# Patient Record
Sex: FEMALE | Race: WHITE | Employment: FULL TIME | URBAN - METROPOLITAN AREA
[De-identification: names, ages, dates, MRNs, and addresses within clinical notes are randomized per-mention and may not be internally consistent; named-entity substitution may affect disease eponyms.]

---

## 2017-01-23 ENCOUNTER — OFFICE VISIT (OUTPATIENT)
Dept: FAMILY MEDICINE | Facility: CLINIC | Age: 37
End: 2017-01-23
Payer: MEDICAID

## 2017-01-23 VITALS
WEIGHT: 221.06 LBS | SYSTOLIC BLOOD PRESSURE: 116 MMHG | HEIGHT: 66 IN | TEMPERATURE: 98.9 F | BODY MASS INDEX: 35.53 KG/M2 | HEART RATE: 99 BPM | OXYGEN SATURATION: 100 % | DIASTOLIC BLOOD PRESSURE: 74 MMHG

## 2017-01-23 DIAGNOSIS — R07.0 THROAT PAIN: Primary | ICD-10-CM

## 2017-01-23 LAB
DEPRECATED S PYO AG THROAT QL EIA: NORMAL
MICRO REPORT STATUS: NORMAL
SPECIMEN SOURCE: NORMAL

## 2017-01-23 PROCEDURE — 99213 OFFICE O/P EST LOW 20 MIN: CPT | Performed by: PHYSICIAN ASSISTANT

## 2017-01-23 PROCEDURE — 87081 CULTURE SCREEN ONLY: CPT | Performed by: PHYSICIAN ASSISTANT

## 2017-01-23 PROCEDURE — 87880 STREP A ASSAY W/OPTIC: CPT | Performed by: PHYSICIAN ASSISTANT

## 2017-01-23 NOTE — MR AVS SNAPSHOT
After Visit Summary   1/23/2017    Maribel Osuna    MRN: [id number]           Patient Information     Date Of Birth          1980        Visit Information        Provider Department      1/23/2017 2:20 PM Wisam Thomson PA-C [facility]        Today's Diagnoses     Throat pain    -  1       Care Instructions      Viral Pharyngitis (Sore Throat)    You (or your child, if your child is the patient) have pharyngitis (sore throat). This infection is caused by a virus. It can cause throat pain that is worse when swallowing, aching all over, headache, and fever. The infection may be spread by coughing, kissing, or touching others after touching your mouth or nose. Antibiotic medications do not work against viruses, so they are not used for treating this condition.  Home care    If your symptoms are severe, rest at home. Return to work or school when you feel well enough.     Drink plenty of fluids to avoid dehydration.    For children: Use acetaminophen for fever, fussiness or discomfort. In infants over six months of age, you may use ibuprofen instead of acetaminophen. (NOTE: If your child has chronic liver or kidney disease or ever had a stomach ulcer or GI bleeding, talk with your doctor before using these medicines.) (NOTE: Aspirin should never be used in anyone under 18 years of age who is ill with a fever. It may cause severe liver damage.)     For adults: You may use acetaminophen or ibuprofen to control pain or fever, unless another medicine was prescribed for this. (NOTE: If you have chronic liver or kidney disease or ever had a stomach ulcer or GI bleeding, talk with your doctor before using these medicines.)    Throat lozenges or numbing throat sprays can help reduce pain. Gargling with warm salt water will also help reduce throat pain. For this, dissolve 1/2 teaspoon of salt in 1 glass of warm water. To help soothe a sore throat, children can sip on juice or a popsicle.  Children 5 years and older can also suck on a lollipop or hard candy.    Avoid salty or spicy foods, which can be irritating to the throat.  Follow-up care  Follow up with your healthcare provider or our staff if you are not improving over the next week.  When to seek medical advice  Call your healthcare provider right away if any of these occur:    Fever as directed by your doctor.  For children, seek care if:    Your child is of any age and has repeated fevers above 104 F (40 C).    Your child is younger than 2 years of age and has a fever of 100.4 F (38 C) that continues for more than 1 day.    Your child is 2 years old or older and has a fever of 100.4 F (38 C) that continues for more than 3 days.    New or worsening ear pain, sinus pain, or headache    Painful lumps in the back of neck    Stiff neck    Lymph nodes are getting larger    Inability to swallow liquids, excessive drooling, or inability to open mouth wide due to throat pain    Signs of dehydration (very dark urine or no urine, sunken eyes, dizziness)    Trouble breathing or noisy breathing    Muffled voice    New rash    Child appears to be getting sicker    9332-5619 [employer]. [address] [place] [address]. All rights reserved. This information is not intended as a substitute for professional medical care. Always follow your healthcare professional's instructions.        Viral Pharyngitis (Sore Throat)    You (or your child, if your child is the patient) have pharyngitis (sore throat). This infection is caused by a virus. It can cause throat pain that is worse when swallowing, aching all over, headache, and fever. The infection may be spread by coughing, kissing, or touching others after touching your mouth or nose. Antibiotic medications do not work against viruses, so they are not used for treating this condition.  Home care    If your symptoms are severe, rest at home. Return to work or school when you feel well  enough.     Drink plenty of fluids to avoid dehydration.    For children: Use acetaminophen for fever, fussiness or discomfort. In infants over six months of age, you may use ibuprofen instead of acetaminophen. (NOTE: If your child has chronic liver or kidney disease or ever had a stomach ulcer or GI bleeding, talk with your doctor before using these medicines.) (NOTE: Aspirin should never be used in anyone under 18 years of age who is ill with a fever. It may cause severe liver damage.)     For adults: You may use acetaminophen or ibuprofen to control pain or fever, unless another medicine was prescribed for this. (NOTE: If you have chronic liver or kidney disease or ever had a stomach ulcer or GI bleeding, talk with your doctor before using these medicines.)    Throat lozenges or numbing throat sprays can help reduce pain. Gargling with warm salt water will also help reduce throat pain. For this, dissolve 1/2 teaspoon of salt in 1 glass of warm water. To help soothe a sore throat, children can sip on juice or a popsicle. Children 5 years and older can also suck on a lollipop or hard candy.    Avoid salty or spicy foods, which can be irritating to the throat.  Follow-up care  Follow up with your healthcare provider or our staff if you are not improving over the next week.  When to seek medical advice  Call your healthcare provider right away if any of these occur:    Fever as directed by your doctor.  For children, seek care if:    Your child is of any age and has repeated fevers above 104 F (40 C).    Your child is younger than 2 years of age and has a fever of 100.4 F (38 C) that continues for more than 1 day.    Your child is 2 years old or older and has a fever of 100.4 F (38 C) that continues for more than 3 days.    New or worsening ear pain, sinus pain, or headache    Painful lumps in the back of neck    Stiff neck    Lymph nodes are getting larger    Inability to swallow liquids, excessive drooling, or  "inability to open mouth wide due to throat pain    Signs of dehydration (very dark urine or no urine, sunken eyes, dizziness)    Trouble breathing or noisy breathing    Muffled voice    New rash    Child appears to be getting sicker    7412-1890 [employer]. [address] [place] [address]. All rights reserved. This information is not intended as a substitute for professional medical care. Always follow your healthcare professional's instructions.              Follow-ups after your visit        Who to contact     If you have questions or need follow up information about today's clinic visit or your schedule please contact [facility] directly at [phone].  Normal or non-critical lab and imaging results will be communicated to you by MyChart, letter or phone within 4 business days after the clinic has received the results. If you do not hear from us within 7 days, please contact the clinic through [employer] or phone. If you have a critical or abnormal lab result, we will notify you by phone as soon as possible.  Submit refill requests through [employer] or call your pharmacy and they will forward the refill request to us. Please allow 3 business days for your refill to be completed.          Additional Information About Your Visit        MyChart Information     [employer] lets you send messages to your doctor, view your test results, renew your prescriptions, schedule appointments and more. To sign up, go to [email or web address] . Click on \"Log in\" on the left side of the screen, which will take you to the Welcome page. Then click on \"Sign up Now\" on the right side of the page.     You will be asked to enter the access code listed below, as well as some personal information. Please follow the directions to create your username and password.     Your access code is: [email or web address]  Expires: 2017  2:42 PM     Your access code will  in 90 days. If you need help or a new code, " "please call your [facility] clinic or [phone].        Care EveryWhere ID     This is your Care EveryWhere ID. This could be used by other organizations to access your [facility] medical records  [id number]        Your Vitals Were     Pulse Temperature Height BMI (Body Mass Index) Pulse Oximetry Last Period    99 98.9  F (37.2  C) (Oral) 5' 5.5\" (1.664 m) 36.21 kg/m2 100% 01/11/2017    Breastfeeding?                   No            Blood Pressure from Last 3 Encounters:   01/23/17 116/74   02/22/16 127/87   02/13/15 106/72    Weight from Last 3 Encounters:   01/23/17 221 lb 1 oz (100.273 kg)   02/22/16 201 lb (91.173 kg)   02/13/15 231 lb (104.781 kg)              We Performed the Following     Beta strep group A culture     Strep, Rapid Screen        Primary Care Provider Office Phone # Fax #    Ida Murguia [phone]       [place] [address]        Thank you!     Thank you for choosing [facility]  for your care. Our goal is always to provide you with excellent care. Hearing back from our patients is one way we can continue to improve our services. Please take a few minutes to complete the written survey that you may receive in the mail after your visit with us. Thank you!             Your Updated Medication List - Protect others around you: Learn how to safely use, store and throw away your medicines at www.disposemymeds.org.          This list is accurate as of: 1/23/17  2:42 PM.  Always use your most recent med list.                   Brand Name Dispense Instructions for use    B-12 1000 MCG Tbcr     100 tablet    Take 100 mg by mouth daily       BENADRYL PO          MELATONIN PO      Take 2.5 mg by mouth At Bedtime       Multi-vitamin Tabs tablet     100 tablet    Take 1 tablet by mouth 3 times daily       triamcinolone 0.1 % cream    KENALOG    80 g    Apply sparingly to affected area three times daily as needed         "

## 2017-01-23 NOTE — PATIENT INSTRUCTIONS
Viral Pharyngitis (Sore Throat)    You (or your child, if your child is the patient) have pharyngitis (sore throat). This infection is caused by a virus. It can cause throat pain that is worse when swallowing, aching all over, headache, and fever. The infection may be spread by coughing, kissing, or touching others after touching your mouth or nose. Antibiotic medications do not work against viruses, so they are not used for treating this condition.  Home care    If your symptoms are severe, rest at home. Return to work or school when you feel well enough.     Drink plenty of fluids to avoid dehydration.    For children: Use acetaminophen for fever, fussiness or discomfort. In infants over six months of age, you may use ibuprofen instead of acetaminophen. (NOTE: If your child has chronic liver or kidney disease or ever had a stomach ulcer or GI bleeding, talk with your doctor before using these medicines.) (NOTE: Aspirin should never be used in anyone under 18 years of age who is ill with a fever. It may cause severe liver damage.)     For adults: You may use acetaminophen or ibuprofen to control pain or fever, unless another medicine was prescribed for this. (NOTE: If you have chronic liver or kidney disease or ever had a stomach ulcer or GI bleeding, talk with your doctor before using these medicines.)    Throat lozenges or numbing throat sprays can help reduce pain. Gargling with warm salt water will also help reduce throat pain. For this, dissolve 1/2 teaspoon of salt in 1 glass of warm water. To help soothe a sore throat, children can sip on juice or a popsicle. Children 5 years and older can also suck on a lollipop or hard candy.    Avoid salty or spicy foods, which can be irritating to the throat.  Follow-up care  Follow up with your healthcare provider or our staff if you are not improving over the next week.  When to seek medical advice  Call your healthcare provider right away if any of these occur:     Fever as directed by your doctor.  For children, seek care if:    Your child is of any age and has repeated fevers above 104 F (40 C).    Your child is younger than 2 years of age and has a fever of 100.4 F (38 C) that continues for more than 1 day.    Your child is 2 years old or older and has a fever of 100.4 F (38 C) that continues for more than 3 days.    New or worsening ear pain, sinus pain, or headache    Painful lumps in the back of neck    Stiff neck    Lymph nodes are getting larger    Inability to swallow liquids, excessive drooling, or inability to open mouth wide due to throat pain    Signs of dehydration (very dark urine or no urine, sunken eyes, dizziness)    Trouble breathing or noisy breathing    Muffled voice    New rash    Child appears to be getting sicker    4907-7028 [employer]. [address] [place] [address]. All rights reserved. This information is not intended as a substitute for professional medical care. Always follow your healthcare professional's instructions.        Viral Pharyngitis (Sore Throat)    You (or your child, if your child is the patient) have pharyngitis (sore throat). This infection is caused by a virus. It can cause throat pain that is worse when swallowing, aching all over, headache, and fever. The infection may be spread by coughing, kissing, or touching others after touching your mouth or nose. Antibiotic medications do not work against viruses, so they are not used for treating this condition.  Home care    If your symptoms are severe, rest at home. Return to work or school when you feel well enough.     Drink plenty of fluids to avoid dehydration.    For children: Use acetaminophen for fever, fussiness or discomfort. In infants over six months of age, you may use ibuprofen instead of acetaminophen. (NOTE: If your child has chronic liver or kidney disease or ever had a stomach ulcer or GI bleeding, talk with your doctor before using these  medicines.) (NOTE: Aspirin should never be used in anyone under 18 years of age who is ill with a fever. It may cause severe liver damage.)     For adults: You may use acetaminophen or ibuprofen to control pain or fever, unless another medicine was prescribed for this. (NOTE: If you have chronic liver or kidney disease or ever had a stomach ulcer or GI bleeding, talk with your doctor before using these medicines.)    Throat lozenges or numbing throat sprays can help reduce pain. Gargling with warm salt water will also help reduce throat pain. For this, dissolve 1/2 teaspoon of salt in 1 glass of warm water. To help soothe a sore throat, children can sip on juice or a popsicle. Children 5 years and older can also suck on a lollipop or hard candy.    Avoid salty or spicy foods, which can be irritating to the throat.  Follow-up care  Follow up with your healthcare provider or our staff if you are not improving over the next week.  When to seek medical advice  Call your healthcare provider right away if any of these occur:    Fever as directed by your doctor.  For children, seek care if:    Your child is of any age and has repeated fevers above 104 F (40 C).    Your child is younger than 2 years of age and has a fever of 100.4 F (38 C) that continues for more than 1 day.    Your child is 2 years old or older and has a fever of 100.4 F (38 C) that continues for more than 3 days.    New or worsening ear pain, sinus pain, or headache    Painful lumps in the back of neck    Stiff neck    Lymph nodes are getting larger    Inability to swallow liquids, excessive drooling, or inability to open mouth wide due to throat pain    Signs of dehydration (very dark urine or no urine, sunken eyes, dizziness)    Trouble breathing or noisy breathing    Muffled voice    New rash    Child appears to be getting sicker    5416-2184 [employer]. [address] [place] [address], [place] [address]. All rights reserved. This information is  not intended as a substitute for professional medical care. Always follow your healthcare professional's instructions.

## 2017-01-23 NOTE — PROGRESS NOTES
SUBJECTIVE:                                                    Maribel Osuna is a 36 year old female who presents to clinic today for the following health issues:      RESPIRATORY SYMPTOMS      Duration:  2 days     Description  sore throat, cough, fever, ear pain bilateral and nausea    Severity: moderate    Accompanying signs and symptoms:  As mentioned above    History (predisposing factors):  none    Precipitating or alleviating factors: None    Therapies tried and outcome:  Advil  Short acting    All.CAILIN Webber          Problem list and histories reviewed & adjusted, as indicated.  Additional history: as documented    Patient Active Problem List   Diagnosis     Lipoma of skin and subcutaneous tissue     Fertility testing     Left knee pain     Common wart     Past Surgical History   Procedure Laterality Date     Excise lesion trunk N/A 9/12/2014     Procedure: EXCISE LESION TRUNK;  Surgeon: Denver Cooper MD;  Location: [facility]       Social History   Substance Use Topics     Smoking status: Never Smoker      Smokeless tobacco: Never Used     Alcohol Use: No     Family History   Problem Relation Age of Onset     C.A.D. Maternal Grandmother      HEART DISEASE Maternal Grandmother      Alcohol/Drug Maternal Grandmother      Gynecology Maternal Grandmother      Lipids Maternal Grandmother      Obesity Maternal Grandmother      Hypertension Mother      Allergies Mother      Arthritis Mother      rheumatoid     Depression Mother      Fa, mgf, mgm, pgm, pgf sis, bro all family     Lipids Mother      Psychotic Disorder Mother      all family     Hypertension Father      Neurologic Disorder Father      Cancer - colorectal Paternal Grandmother      Alcohol/Drug Paternal Grandmother      Alcohol/Drug Maternal Grandfather      Alcohol/Drug Paternal Grandfather      Allergies Sister      Allergies Brother      DIABETES No family hx of      Coronary Artery Disease No family hx of      Hyperlipidemia No family hx of       "CEREBROVASCULAR DISEASE No family hx of      Breast Cancer No family hx of      Colon Cancer No family hx of      Prostate Cancer No family hx of      Other Cancer No family hx of      Anxiety Disorder No family hx of      MENTAL ILLNESS No family hx of      Substance Abuse No family hx of      Anesthesia Reaction No family hx of      Asthma No family hx of      OSTEOPOROSIS No family hx of      Genetic Disorder No family hx of      Thyroid Disease No family hx of      Unknown/Adopted No family hx of            ROS:  Constitutional, HEENT, cardiovascular, pulmonary, gi and gu systems are negative, except as otherwise noted.    OBJECTIVE:                                                    /74 mmHg  Pulse 99  Temp(Src) 98.9  F (37.2  C) (Oral)  Ht 5' 5.5\" (1.664 m)  Wt 221 lb 1 oz (100.273 kg)  BMI 36.21 kg/m2  SpO2 100%  LMP 01/11/2017  Breastfeeding? No  Body mass index is 36.21 kg/(m^2).  GENERAL: alert and no distress  EYES: Eyes grossly normal to inspection  HENT: ear canals and TM's normal, nose and mouth without ulcers or lesions  NECK: no adenopathy, no asymmetry, masses, or scars and thyroid normal to palpation  RESP: lungs clear to auscultation - no rales, rhonchi or wheezes  CV: regular rate and rhythm, normal S1 S2, no S3 or S4, no murmur, click or rub, no peripheral edema and peripheral pulses strong    Diagnostic Test Results:  Results for orders placed or performed in visit on 01/23/17 (from the past 24 hour(s))   Strep, Rapid Screen   Result Value Ref Range    Specimen Description Throat     Rapid Strep A Screen       NEGATIVE: No Group A streptococcal antigen detected by immunoassay, await   culture report.      Micro Report Status FINAL 01/23/2017         ASSESSMENT/PLAN:                                                            1. Throat pain  At home care instructions given.  Supportive care.  - Strep, Rapid Screen  - Beta strep group A culture    Follow up if symptoms persist or " worsen     Wisam Thomson PA-C  [facility]

## 2017-01-25 LAB
BACTERIA SPEC CULT: NORMAL
MICRO REPORT STATUS: NORMAL
SPECIMEN SOURCE: NORMAL

## 2017-01-27 ENCOUNTER — TELEPHONE (OUTPATIENT)
Dept: FAMILY MEDICINE | Facility: CLINIC | Age: 37
End: 2017-01-27

## 2017-01-27 DIAGNOSIS — H10.32 ACUTE CONJUNCTIVITIS OF LEFT EYE: Primary | ICD-10-CM

## 2017-01-27 RX ORDER — POLYMYXIN B SULFATE AND TRIMETHOPRIM 1; 10000 MG/ML; [USP'U]/ML
1 SOLUTION OPHTHALMIC 4 TIMES DAILY
Qty: 2 ML | Refills: 0 | Status: SHIPPED | OUTPATIENT
Start: 2017-01-27 | End: 2017-02-28

## 2017-01-27 NOTE — TELEPHONE ENCOUNTER
RN Conjunctivitis Protocol: Ages 2 and older  Maribel Osuna is a 36 year old female is having symptoms reviewed for possible conjunctivitis.      ASSESSMENT/PLAN:  Allergy to Sulfa?  No   1.  Medication Indicated: YES - POLYTRIM 42346-1.1 UNIT/ML-% OP Sol, 1 drop in affected eye(s) 4 times daily while awake x 7 days. .    2.  Education regarding contact precautions, hand washing, avoid wearing contacts until finished with drops or until symptoms resolve, contact clinic if there is no improvement of symptoms within 3 days and if develops eye pain or sensitivity to light.   3.  Follow-up: Contact provider's triage RN if symptoms do not improve after 3 days of antibiotic treatment or if symptoms return after antibiotic therapy is complete.  4.  Patient verbalized understanding of this plan and is agreeable.    SUBJECTIVE:     Conjunctival symptoms: mattering (yellow/green), watery or pus discharge and irritated   Location: left eye  Onset: 2 day ago  In addition notes: None  Contact Lens use?: Yes; clean or dispose of current contacts, no contact use for 24 hrs from initiation of antibiotic therapy. States she hasn't worn them for several days.  Complicating factors    Reports:NONE  Denies:Vision changes; not cleared with blinking, Recent  history of eye trauma, Sensitivity to light, Severe eye pain, Fever: low grade, high spiking, >101, persistant, w/chills .  Eyelid symptoms None      OBJECTIVE:     Encounter handled by: Nurse Triage.     May Rene RN

## 2017-01-27 NOTE — TELEPHONE ENCOUNTER
Reason for call:  Patient reporting a symptom    Symptom or request: pink eye    Duration (how long have symptoms been present): developing since 1/23    Have you been treated for this before? No    Additional comments: states was just seen 1/23 and thinks she develop pink eye    Phone Number patient can be reached at:  Home number on file [phone] (home)    Best Time:  anytime    Can we leave a detailed message on this number:  YES    Call taken on 1/27/2017 at 7:36 AM by Claudette Marr

## 2017-02-02 ENCOUNTER — TELEPHONE (OUTPATIENT)
Dept: FAMILY MEDICINE | Facility: CLINIC | Age: 37
End: 2017-02-02

## 2017-02-02 DIAGNOSIS — J01.90 ACUTE SINUSITIS WITH SYMPTOMS > 10 DAYS: Primary | ICD-10-CM

## 2017-02-02 NOTE — TELEPHONE ENCOUNTER
Reason for Call:  Other call back    Detailed comments: In 2 weeks ago pt still not feeling well. Switched  To both ear plugged up throat still hurts.Should pt be seen again??    Phone Number Patient can be reached at: Cell number on file:    Telephone Information:   Mobile [phone]       Best Time: any    Can we leave a detailed message on this number? YES    Call taken on 2/2/2017 at 9:55 AM by May Mccollum

## 2017-02-03 ENCOUNTER — TELEPHONE (OUTPATIENT)
Dept: FAMILY MEDICINE | Facility: CLINIC | Age: 37
End: 2017-02-03

## 2017-02-03 NOTE — TELEPHONE ENCOUNTER
Patient call back and would like for a nurse to call her . Stated the she is not felling any better, she can't ear out of both ears. And here is her correct phone number   [phone]. She is returning the call from Kajal Denise on 02/02/17

## 2017-02-03 NOTE — TELEPHONE ENCOUNTER
Antibiotic sent to pharmacy.  If symptoms persist or worsen into next week, would encourage follow up.    Omar Thomson PA-C

## 2017-02-03 NOTE — TELEPHONE ENCOUNTER
Clinic does not have samples.  Patient informed of this; she will go without medication and continue to rest and push fluids.  If worsening/not improving will call us back to schedule f/u deot  Kajal COKER RN

## 2017-02-03 NOTE — TELEPHONE ENCOUNTER
Reason for Call:  Other call back    Detailed comments: The patient is in the middle of a insurance craven with [place] and cannot afford  The Antibiotics   The patient would like a call back to see if there is anything we can do to help or if we have samples    Phone Number Patient can be reached at: Home number on file [phone] (home)    Best Time: anytime    Can we leave a detailed message on this number? NO    Call taken on 2/3/2017 at 11:28 AM by Monica Wilks

## 2017-02-03 NOTE — TELEPHONE ENCOUNTER
JS,  Patient saw you 1/23/2017 for sore throat.  Pt has been taking Ibuprofen 800mg PRN as you advised.  About 2 days after seeing you, ears started becoming plugged.  Started taking Mucinex PRN also.  Said her sore throat has improved, but is still present.  Bilateral ears also plugged (sounds are muffled).  Also got pink eye; is being treated for this.  Pt wondering if abx or something further needed since sore throat x2wks and new ear symptoms.  Pharmacy pended if needed.  Please advise.  Kajal COKER RN

## 2017-02-28 ENCOUNTER — OFFICE VISIT (OUTPATIENT)
Dept: FAMILY MEDICINE | Facility: CLINIC | Age: 37
End: 2017-02-28
Payer: MEDICAID

## 2017-02-28 VITALS
TEMPERATURE: 98.1 F | DIASTOLIC BLOOD PRESSURE: 76 MMHG | OXYGEN SATURATION: 100 % | SYSTOLIC BLOOD PRESSURE: 102 MMHG | HEART RATE: 107 BPM | BODY MASS INDEX: 35.73 KG/M2 | HEIGHT: 66 IN | WEIGHT: 222.31 LBS

## 2017-02-28 DIAGNOSIS — J31.0 CHRONIC RHINITIS: ICD-10-CM

## 2017-02-28 DIAGNOSIS — H10.12 ALLERGIC CONJUNCTIVITIS OF LEFT EYE: Primary | ICD-10-CM

## 2017-02-28 DIAGNOSIS — F41.9 ANXIETY: ICD-10-CM

## 2017-02-28 DIAGNOSIS — H10.32 ACUTE CONJUNCTIVITIS OF LEFT EYE, UNSPECIFIED ACUTE CONJUNCTIVITIS TYPE: ICD-10-CM

## 2017-02-28 PROCEDURE — 99214 OFFICE O/P EST MOD 30 MIN: CPT | Performed by: FAMILY MEDICINE

## 2017-02-28 RX ORDER — LORATADINE 10 MG/1
10 TABLET ORAL DAILY
Qty: 30 TABLET | Refills: 11 | Status: SHIPPED | OUTPATIENT
Start: 2017-02-28 | End: 2018-03-06

## 2017-02-28 RX ORDER — FLUTICASONE PROPIONATE 50 MCG
1-2 SPRAY, SUSPENSION (ML) NASAL DAILY PRN
Qty: 1 G | Refills: 11 | Status: SHIPPED | OUTPATIENT
Start: 2017-02-28 | End: 2018-03-06

## 2017-02-28 RX ORDER — POLYMYXIN B SULFATE AND TRIMETHOPRIM 1; 10000 MG/ML; [USP'U]/ML
1 SOLUTION OPHTHALMIC 4 TIMES DAILY
Qty: 2 ML | Refills: 0 | Status: SHIPPED
Start: 2017-02-28 | End: 2017-08-22

## 2017-02-28 RX ORDER — OLOPATADINE HYDROCHLORIDE 1 MG/ML
1 SOLUTION/ DROPS OPHTHALMIC 2 TIMES DAILY
Qty: 5 ML | Refills: 3 | Status: SHIPPED | OUTPATIENT
Start: 2017-02-28 | End: 2017-08-22

## 2017-02-28 ASSESSMENT — PATIENT HEALTH QUESTIONNAIRE - PHQ9: 5. POOR APPETITE OR OVEREATING: NEARLY EVERY DAY

## 2017-02-28 ASSESSMENT — ANXIETY QUESTIONNAIRES
GAD7 TOTAL SCORE: 12
2. NOT BEING ABLE TO STOP OR CONTROL WORRYING: MORE THAN HALF THE DAYS
5. BEING SO RESTLESS THAT IT IS HARD TO SIT STILL: SEVERAL DAYS
7. FEELING AFRAID AS IF SOMETHING AWFUL MIGHT HAPPEN: NEARLY EVERY DAY
IF YOU CHECKED OFF ANY PROBLEMS ON THIS QUESTIONNAIRE, HOW DIFFICULT HAVE THESE PROBLEMS MADE IT FOR YOU TO DO YOUR WORK, TAKE CARE OF THINGS AT HOME, OR GET ALONG WITH OTHER PEOPLE: VERY DIFFICULT
3. WORRYING TOO MUCH ABOUT DIFFERENT THINGS: SEVERAL DAYS
1. FEELING NERVOUS, ANXIOUS, OR ON EDGE: SEVERAL DAYS
6. BECOMING EASILY ANNOYED OR IRRITABLE: SEVERAL DAYS

## 2017-02-28 NOTE — NURSING NOTE
"Chief Complaint   Patient presents with     Eye Problem     /76 (BP Location: Right arm, Patient Position: Right side, Cuff Size: Adult Large)  Pulse 107  Temp 98.1  F (36.7  C) (Tympanic)  Ht 5' 5.5\" (1.664 m)  Wt 222 lb 5 oz (100.8 kg)  SpO2 100%  Breastfeeding? No  BMI 36.43 kg/m2 Estimated body mass index is 36.43 kg/(m^2) as calculated from the following:    Height as of this encounter: 5' 5.5\" (1.664 m).    Weight as of this encounter: 222 lb 5 oz (100.8 kg).  bp completed using cuff size: large      Health Maintenance addressed:  NONE    n/a              "
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
## 2017-02-28 NOTE — MR AVS SNAPSHOT
After Visit Summary   2/28/2017    Maribel Osuna    MRN: [id number]           Patient Information     Date Of Birth          1980        Visit Information        Provider Department      2/28/2017 9:00 AM Ida Murguia MD [facility]        Today's Diagnoses     Allergic conjunctivitis of left eye    -  1    Acute conjunctivitis of left eye, unspecified acute conjunctivitis type        Chronic rhinitis        Anxiety          Care Instructions    Most likely  Allergic conjunctivitis of left eye   Plan:start with anti allergy medications use twice daily as needed   olopatadine (PATANOL) 0.1 % ophthalmic         Solution,  If Patanol is not helping- and or unable to see eye specialist.  And more eye discharge than ok to use antibiotic eye drops  trimethoprim-polymyxin b (POLYTRIM) ophthalmic         solution    Given the duration of the symptoms- i do recommend eye specialist consultation   OPHTHALMOLOGY ADULT REFERRAL    Start flonase and claritin for allergy symptoms    Carly Gershone, MA, [facility]  Outpatient Clinic Therapist  [phone] for appointments/referrals                Follow-ups after your visit        Additional Services     MENTAL HEALTH REFERRAL       Your provider has referred you to: [facility]: [facility] - [facility] Primary Care [facility] [phone]   [email or web address]   *Referral from [facility] Primary Care Provider required - Consultation Model - medication management & future refills will be returned to [facility] PCP upon completion of evaluation  *Please call to schedule an appointment.    Please be aware that coverage of these services is subject to the terms and limitations of your health insurance plan.  Call member services at your health plan with any benefit or coverage questions.      Please bring the following to your appointment:  >>   Any x-rays, CTs or  MRIs which have been performed.  Contact the facility where they were done to arrange for  prior to your scheduled appointment.  Any new CT, MRI or other procedures ordered by your specialist must be performed at a [place] facility or coordinated by your clinic's referral office.    >>   List of current medications   >>   This referral request   >>   Any documents/labs given to you for this referral            OPHTHALMOLOGY ADULT REFERRAL       Your provider has referred you to: [facility]: Eye Clinic [facility] [phone]   [email or web address]  [facility]: [facility] [phone]    Please be aware that coverage of these services is subject to the terms and limitations of your health insurance plan.  Call member services at your health plan with any benefit or coverage questions.      Please bring the following with you to your appointment:    (1) Any X-Rays, CTs or MRIs which have been performed.  Contact the facility where they were done to arrange for  prior to your scheduled appointment.    (2) List of current medications  (3) This referral request   (4) Any documents/labs given to you for this referral                  Follow-up notes from your care team     Return in about 4 weeks (around 3/28/2017).      Who to contact     If you have questions or need follow up information about today's clinic visit or your schedule please contact [facility] directly at [phone].  Normal or non-critical lab and imaging results will be communicated to you by MyChart, letter or phone within 4 business days after the clinic has received the results. If you do not hear from us within 7 days, please contact the clinic through MyChart or phone. If you have a critical or abnormal lab result, we will notify you by phone as soon as possible.  Submit refill requests through [employer] or call your pharmacy and they will forward the refill request to us. Please  "allow 3 business days for your refill to be completed.          Additional Information About Your Visit        MyChart Information     [employer] lets you send messages to your doctor, view your test results, renew your prescriptions, schedule appointments and more. To sign up, go to [email or web address] . Click on \"Log in\" on the left side of the screen, which will take you to the Welcome page. Then click on \"Sign up Now\" on the right side of the page.     You will be asked to enter the access code listed below, as well as some personal information. Please follow the directions to create your username and password.     Your access code is: [email or web address]  Expires: 2017  2:42 PM     Your access code will  in 90 days. If you need help or a new code, please call your [place] clinic or [phone].        Care EveryWhere ID     This is your Care EveryWhere ID. This could be used by other organizations to access your [place] medical records  [id number]        Your Vitals Were     Pulse Temperature Height Pulse Oximetry Breastfeeding? BMI (Body Mass Index)    107 98.1  F (36.7  C) (Tympanic) 5' 5.5\" (1.664 m) 100% No 36.43 kg/m2       Blood Pressure from Last 3 Encounters:   17 102/76   17 116/74   16 127/87    Weight from Last 3 Encounters:   17 222 lb 5 oz (100.8 kg)   17 221 lb 1 oz (100.3 kg)   16 201 lb (91.2 kg)              We Performed the Following     MENTAL HEALTH REFERRAL     OPHTHALMOLOGY ADULT REFERRAL          Today's Medication Changes          These changes are accurate as of: 17  9:50 AM.  If you have any questions, ask your nurse or doctor.               Start taking these medicines.        Dose/Directions    fluticasone 50 MCG/ACT spray   Commonly known as:  FLONASE   Used for:  Chronic rhinitis   Started by:  Ida Murguia MD        Dose:  1-2 spray   Spray 1-2 sprays into both nostrils daily as needed for rhinitis or allergies   Quantity: "  1 g   Refills:  11       loratadine 10 MG tablet   Commonly known as:  CLARITIN   Used for:  Chronic rhinitis   Started by:  Ida Murguia MD        Dose:  10 mg   Take 1 tablet (10 mg) by mouth daily   Quantity:  30 tablet   Refills:  11       olopatadine 0.1 % ophthalmic solution   Commonly known as:  PATANOL   Used for:  Allergic conjunctivitis of left eye   Started by:  Ida Murguia MD        Dose:  1 drop   Place 1 drop Into the left eye 2 times daily   Quantity:  5 mL   Refills:  3       trimethoprim-polymyxin b ophthalmic solution   Commonly known as:  POLYTRIM   Used for:  Acute conjunctivitis of left eye, unspecified acute conjunctivitis type   Started by:  Ida Murguia MD        Dose:  1 drop   Place 1 drop Into the left eye 4 times daily   Quantity:  2 mL   Refills:  0            Where to get your medicines      These medications were sent to [facility] [address] [place] [address] [address] [facility] [address] [facility] [address]     Phone:  [phone]     fluticasone 50 MCG/ACT spray    loratadine 10 MG tablet    olopatadine 0.1 % ophthalmic solution    trimethoprim-polymyxin b ophthalmic solution                Primary Care Provider Office Phone # Fax #    Ida Murguia [phone]       [address] [address]        Thank you!     Thank you for choosing [facility]  for your care. Our goal is always to provide you with excellent care. Hearing back from our patients is one way we can continue to improve our services. Please take a few minutes to complete the written survey that you may receive in the mail after your visit with us. Thank you!             Your Updated Medication List - Protect others around you: Learn how to safely use, store and throw away your medicines at www.disposemymeds.org.          This list is accurate as of: 2/28/17  9:50 AM.   Always use your most recent med list.                   Brand Name Dispense Instructions for use    B-12 1000 MCG Tbcr     100 tablet    Take 100 mg by mouth daily       BENADRYL PO          fluticasone 50 MCG/ACT spray    FLONASE    1 g    Spray 1-2 sprays into both nostrils daily as needed for rhinitis or allergies       loratadine 10 MG tablet    CLARITIN    30 tablet    Take 1 tablet (10 mg) by mouth daily       MELATONIN PO      Take 2.5 mg by mouth At Bedtime       Multi-vitamin Tabs tablet     100 tablet    Take 1 tablet by mouth 3 times daily       olopatadine 0.1 % ophthalmic solution    PATANOL    5 mL    Place 1 drop Into the left eye 2 times daily       trimethoprim-polymyxin b ophthalmic solution    POLYTRIM    2 mL    Place 1 drop Into the left eye 4 times daily

## 2017-02-28 NOTE — PATIENT INSTRUCTIONS
Most likely  Allergic conjunctivitis of left eye   Plan:start with anti allergy medications use twice daily as needed   olopatadine (PATANOL) 0.1 % ophthalmic         Solution,  If Patanol is not helping- and or unable to see eye specialist.  And more eye discharge than ok to use antibiotic eye drops  trimethoprim-polymyxin b (POLYTRIM) ophthalmic         solution    Given the duration of the symptoms- i do recommend eye specialist consultation   OPHTHALMOLOGY ADULT REFERRAL    Start flonase and claritin for allergy symptoms    Carly Gershone, MA, [facility]  Outpatient Clinic Therapist  [phone] for appointments/referrals

## 2017-03-01 ASSESSMENT — ANXIETY QUESTIONNAIRES: GAD7 TOTAL SCORE: 12

## 2017-03-06 PROBLEM — F41.9 ANXIETY: Status: ACTIVE | Noted: 2017-03-06

## 2017-04-11 ENCOUNTER — OFFICE VISIT (OUTPATIENT)
Dept: PSYCHOLOGY | Facility: CLINIC | Age: 37
End: 2017-04-11
Payer: COMMERCIAL

## 2017-04-11 DIAGNOSIS — F43.10 PTSD (POST-TRAUMATIC STRESS DISORDER): Primary | ICD-10-CM

## 2017-04-11 PROCEDURE — 90791 PSYCH DIAGNOSTIC EVALUATION: CPT | Performed by: SOCIAL WORKER

## 2017-04-11 NOTE — MR AVS SNAPSHOT
"                  MRN:[id number]                      After Visit Summary   2017    Maribel Osuna    MRN: [id number]           Visit Information        Provider Department      2017 12:00 PM Keiry Sherman [facility]      Your next 10 appointments already scheduled     2017  4:00 PM CDT   Return Visit with Keiry Jeffdiane [facility]    [address]  [address]  [facility] [address] [phone]              MyChart Information     [facility] lets you send messages to your doctor, view your test results, renew your prescriptions, schedule appointments and more. To sign up, go to [email or web address] . Click on \"Log in\" on the left side of the screen, which will take you to the Welcome page. Then click on \"Sign up Now\" on the right side of the page.     You will be asked to enter the access code listed below, as well as some personal information. Please follow the directions to create your username and password.     Your access code is: [email or web address]  Expires: 2017  3:42 PM     Your access code will  in 90 days. If you need help or a new code, please call your [place] clinic or [phone].        Care EveryWhere ID     This is your Care EveryWhere ID. This could be used by other organizations to access your [place] medical records  [id number]        "

## 2017-04-12 PROBLEM — F43.10 PTSD (POST-TRAUMATIC STRESS DISORDER): Status: ACTIVE | Noted: 2017-04-12

## 2017-04-12 ASSESSMENT — PATIENT HEALTH QUESTIONNAIRE - PHQ9: 5. POOR APPETITE OR OVEREATING: NEARLY EVERY DAY

## 2017-04-12 ASSESSMENT — ANXIETY QUESTIONNAIRES
3. WORRYING TOO MUCH ABOUT DIFFERENT THINGS: SEVERAL DAYS
5. BEING SO RESTLESS THAT IT IS HARD TO SIT STILL: SEVERAL DAYS
2. NOT BEING ABLE TO STOP OR CONTROL WORRYING: NEARLY EVERY DAY
7. FEELING AFRAID AS IF SOMETHING AWFUL MIGHT HAPPEN: NEARLY EVERY DAY
IF YOU CHECKED OFF ANY PROBLEMS ON THIS QUESTIONNAIRE, HOW DIFFICULT HAVE THESE PROBLEMS MADE IT FOR YOU TO DO YOUR WORK, TAKE CARE OF THINGS AT HOME, OR GET ALONG WITH OTHER PEOPLE: VERY DIFFICULT
1. FEELING NERVOUS, ANXIOUS, OR ON EDGE: NEARLY EVERY DAY
6. BECOMING EASILY ANNOYED OR IRRITABLE: MORE THAN HALF THE DAYS
GAD7 TOTAL SCORE: 16
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
## 2017-04-13 ASSESSMENT — ANXIETY QUESTIONNAIRES: GAD7 TOTAL SCORE: 16

## 2017-04-13 ASSESSMENT — PATIENT HEALTH QUESTIONNAIRE - PHQ9: SUM OF ALL RESPONSES TO PHQ QUESTIONS 1-9: 19
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
## 2017-04-24 ENCOUNTER — FCC EXTENDED DOCUMENTATION (OUTPATIENT)
Dept: PSYCHOLOGY | Facility: CLINIC | Age: 37
End: 2017-04-24

## 2017-04-24 NOTE — PROGRESS NOTES
Discharge Summary  Single Session    Client Name: Maribel Osuna MRN#: [id number] YOB: 1980      Intake / Discharge Date: 4/11/17      DSM5 Diagnoses: (Sustained by DSM5 Criteria Listed Above)  Diagnoses: 309.81 (F43.10) Posttraumatic Stress Disorder (includes Posttraumatic Stress Disorder for Children 6 Years and Younger)  Without dissociative symptoms  Psychosocial & Contextual Factors: Under treated issues.  WHODAS 2.0 (12 item) Score: see intake.          Presenting Concern:    Anxiety and depression related to re triggering of past trauma.    Reason for Discharge:  Referred to [facility].      Disposition at Time of Last Encounter:   Comments:   Stable.     Risk Management:   Client denies a history of suicidal ideation, suicide attempts, self-injurious behavior, homicidal ideation, homicidal behavior and and other safety concerns  A safety and risk management plan has not been developed at this time, however client was given the after-hours number / 911 should there be a change in any of these risk factors.      Referred To:  [facility]. Intake only. Case closed in [facility].        CLARA Kumari   4/24/2017

## 2017-08-22 ENCOUNTER — OFFICE VISIT (OUTPATIENT)
Dept: OPHTHALMOLOGY | Facility: CLINIC | Age: 37
End: 2017-08-22

## 2017-08-22 DIAGNOSIS — H52.13 MYOPIA, BILATERAL: Primary | ICD-10-CM

## 2017-08-22 DIAGNOSIS — H02.889 MEIBOMIAN GLAND DYSFUNCTION: ICD-10-CM

## 2017-08-22 RX ORDER — MINERAL OIL AND PETROLATUM 150; 830 MG/G; MG/G
OINTMENT OPHTHALMIC
Refills: 6 | COMMUNITY
Start: 2017-08-08

## 2017-08-22 ASSESSMENT — REFRACTION_WEARINGRX
OS_SPHERE: -3.00
OD_CYLINDER: +2.00
OS_AXIS: 155
OS_CYLINDER: +2.25
OD_SPHERE: -2.25
OD_AXIS: 017

## 2017-08-22 ASSESSMENT — CUP TO DISC RATIO
OD_RATIO: 0.3
OS_RATIO: 0.3

## 2017-08-22 ASSESSMENT — TONOMETRY
OS_IOP_MMHG: 19
OD_IOP_MMHG: 19
IOP_METHOD: ICARE

## 2017-08-22 ASSESSMENT — CONF VISUAL FIELD
OS_NORMAL: 1
OD_NORMAL: 1

## 2017-08-22 ASSESSMENT — VISUAL ACUITY
OS_CC: 20/20
OD_CC: 20/20
CORRECTION_TYPE: GLASSES
METHOD: SNELLEN - LINEAR

## 2017-08-22 ASSESSMENT — EXTERNAL EXAM - RIGHT EYE: OD_EXAM: NORMAL

## 2017-08-22 ASSESSMENT — SLIT LAMP EXAM - LIDS
COMMENTS: TR MGD
COMMENTS: TR MGD

## 2017-08-22 ASSESSMENT — EXTERNAL EXAM - LEFT EYE: OS_EXAM: NORMAL

## 2017-08-22 NOTE — MR AVS SNAPSHOT
After Visit Summary   2017    Maribel Osuna    MRN: [id number]           Patient Information     Date Of Birth          1980        Visit Information        Provider Department      2017 3:00 PM Mark Reid OD M [facility]        Today's Diagnoses     Myopia, bilateral    -  1    Meibomian gland dysfunction           Follow-ups after your visit        Follow-up notes from your care team     Return in about 2 weeks (around 2017) for Contact Lens Fitting.      Your next 10 appointments already scheduled     Aug 24, 2017  3:45 PM CDT   [facility] Kan Matias DPM   [facility]    [address] [address] [phone]              Who to contact     Please call your clinic at [phone] to:    Ask questions about your health    Make or cancel appointments    Discuss your medicines    Learn about your test results    Speak to your doctor   If you have compliments or concerns about an experience at your clinic, or if you wish to file a complaint, please contact [facility] Patient Relations at [phone] or email us at [email or web address]         Additional Information About Your Visit        MyChart Information     [email or web address] is an electronic gateway that provides easy, online access to your medical records. With [employer], you can request a clinic appointment, read your test results, renew a prescription or communicate with your care team.     To sign up for [email or web address] visit the website at [email or web address]   You will be asked to enter the access code listed below, as well as some personal information. Please follow the directions to create your username and password.     Your access code is: [email or web address]  Expires: 2017  6:30 AM     Your access code will  in 90 days. If you need help or a new code, please contact your [facility] or call [phone] for assistance.        Care EveryWhere ID     This is your Care EveryWhere ID. This could be used by other organizations to access your [place] medical records  [id number]         Blood Pressure from Last 3 Encounters:   02/28/17 102/76   01/23/17 116/74   02/22/16 127/87    Weight from Last 3 Encounters:   02/28/17 100.8 kg (222 lb 5 oz)   01/23/17 100.3 kg (221 lb 1 oz)   02/22/16 91.2 kg (201 lb)              We Performed the Following     HC CONTACT LENS FITTING COSMETIC LVL 1 (03113.011)        Primary Care Provider Office Phone # Fax #    Ida Murguia [phone] [address] [address]        Equal Access to Services     MIKI VALDEZ : Hadii aad ku hadasho Soomaali, waaxda luqadaha, qaybta kaalmada adeashlie, sophia salazar . So [facility] [phone].    ATENCIÓN: Si habla español, tiene a quinonez disposición servicios gratuitos de asistencia lingüística. Kaylan al [phone].    We comply with applicable federal civil rights laws and [place] laws. We do not discriminate on the basis of race, color, national origin, age, disability sex, sexual orientation or gender identity.            Thank you!     Thank you for choosing [facility] OPH[facility]  for your care. Our goal is always to provide you with excellent care. Hearing back from our patients is one way we can continue to improve our services. Please take a few minutes to complete the written survey that you may receive in the mail after your visit with us. Thank you!             Your Updated Medication List - Protect others around you: Learn how to safely use, store and throw away your medicines at www.disposemymeds.org.          This list is accurate as of: 8/22/17 11:59 PM.  Always use your most recent med list.                   Brand Name Dispense Instructions for use Diagnosis    ARTIFICIAL TEARS 83-15 % Oint           B-12 1000 MCG  Tbcr     100 tablet    Take 100 mg by mouth daily        fluticasone 50 MCG/ACT spray    FLONASE    1 g    Spray 1-2 sprays into both nostrils daily as needed for rhinitis or allergies    Chronic rhinitis       loratadine 10 MG tablet    CLARITIN    30 tablet    Take 1 tablet (10 mg) by mouth daily    Chronic rhinitis       MELATONIN PO      Take 2.5 mg by mouth At Bedtime        Multi-vitamin Tabs tablet     100 tablet    Take 1 tablet by mouth 3 times daily

## 2017-08-22 NOTE — NURSING NOTE
Chief Complaints and History of Present Illnesses   Patient presents with     Annual Eye Exam     CL     HPI    Affected eye(s):  Both   Symptoms:     Blurred vision      Frequency:  Constant       Do you have eye pain now?:  No      Comments:  She had a full exam 3/2017. Does not need MR today but would like CLs. Current glasses not from recent MR.        Told she would need to wear sunglasses at all times, and she has spots in back of the eyes that might require surgery in the future.     She also had dryness that has been treating with:  Warm compresses 2-3 times per day. ATs QID OU, AT kings qhs OU.     She would like to have CLs again, ran out about a year ago and hoping dryness will not get in the way.     Sameera Chavez COT 3:19 PM August 22, 2017

## 2017-08-22 NOTE — PROGRESS NOTES
Assessment/Plan  (H52.13) Myopia, bilateral  (primary encounter diagnosis)  Comment:  Compound myopic astigmatism OU  Plan: HC CONTACT LENS FITTING COSMETIC LVL 1 (05124.011), CANCELED: REFRACTION [15042]   Trial contact lenses ordered based on previous refraction, as refraction was done within the last 6 months.     (H02.89) Meibomian gland dysfunction  Comment: Currently using warm compresses and ATs prn  Plan:  Recommended continued warm compresses twice each day for ten minutes. Monitor annually, or sooner if symptoms worsen.      Complete documentation of historical and exam elements from today's encounter can  be found in the full encounter summary report (not reduplicated in this progress  note). I personally obtained the chief complaint(s) and history of present illness. I  confirmed and edited as necessary the review of systems, past medical/surgical  history, family history, social history, and examination findings as documented by  others; and I examined the patient myself. I personally reviewed the relevant tests,  images, and reports as documented above. I formulated and edited as necessary the  assessment and plan and discussed the findings and management plan with the  patient and family.    Mark Reid OD, FAAO

## 2017-08-24 ENCOUNTER — OFFICE VISIT (OUTPATIENT)
Dept: PODIATRY | Facility: CLINIC | Age: 37
End: 2017-08-24
Payer: COMMERCIAL

## 2017-08-24 VITALS
HEIGHT: 66 IN | DIASTOLIC BLOOD PRESSURE: 78 MMHG | WEIGHT: 222 LBS | BODY MASS INDEX: 35.68 KG/M2 | SYSTOLIC BLOOD PRESSURE: 104 MMHG

## 2017-08-24 DIAGNOSIS — B07.0 PLANTAR WART: ICD-10-CM

## 2017-08-24 DIAGNOSIS — B35.1 ONYCHOMYCOSIS: Primary | ICD-10-CM

## 2017-08-24 PROCEDURE — 17110 DESTRUCTION B9 LES UP TO 14: CPT | Performed by: PODIATRIST

## 2017-08-24 PROCEDURE — 99213 OFFICE O/P EST LOW 20 MIN: CPT | Mod: 25 | Performed by: PODIATRIST

## 2017-08-24 RX ORDER — CICLOPIROX 7.7 MG/G
GEL TOPICAL
Qty: 45 G | Refills: 1 | Status: SHIPPED | OUTPATIENT
Start: 2017-08-24 | End: 2018-03-27

## 2017-08-24 NOTE — PROGRESS NOTES
"Foot & Ankle Surgery   August 24, 2017    S:  Pt is seen today for evaluation of skin lesion plantar L heel.  I saw her for this approx 2 1/2 years ago..  It was 1.2 x 1.3cm at her last visit.  Stopped follow up because of insurance issues. She was also givne a Rx for L hallux nial fungus.  It was doing well but she couldn't get a refill.    Vitals:    08/24/17 1547   BP: 104/78   Weight: 222 lb (100.7 kg)   Height: 5' 5.5\" (1.664 m)   '      ROS - Pos for CC.  Patient denies current nausea, vomiting, chills, fevers, belly pain, calf pain, chest pain or SOB.  Complete remainder of ROS it otherwise neg.      PE:  Gen:   No apparent distress  Neuro:   A&Ox3, no deficits  Psych:    Answering questions appropriately for age and situation with normal affect  Head:    NCAT  Eye:    Visual scanning without deficit  Ear:    Response to auditory stimuli wnl  Lung:    Non-labored breathing on RA noted  Abd:    NTND per patient report  Lymph:    Neg for pitting/non-pitting edema BLE  Vasc:    Pulses palpable, CFT minimally delayed  Neuro:    Light touch sensation intact to all sensory nerve distributions without paresthesias  Derm:    L hallux nail is thickened, discolored, dystrophic.  Plantar heel skin lesion 5 x 2mm, divergent skin lines but direct > lateral compression pain  MSK:    ROM, strength wnl without limitation, no pain on palpation noted.  Bunion L foot  Calf:    Neg for redness, swelling or tenderness      Assessment:  36 year old female with plantar wart L heel; onychomycosis L heel      Plan:  Discussed etiologies/options  1.  Plantar wart L heel  -clinically doesn't have all characteristics of a wart, but had a large wart in this location in the past.  Also discussed possibility of scar tissue  -debrided with 15 blade  -liquid N2 and phenol applied    2.  L hallux nail fungus  -Rx for penlac gel with instructions  -home fungus handout dispensed and discussed      Follow up:  2 weeks or sooner with acute " issues      Body mass index is 36.38 kg/(m^2).  Weight management plan: Patient was referred to their PCP to discuss a diet and exercise plan.         Kan Matias DPM   Podiatric Foot & Ankle Surgeon  [facility]  [phone]

## 2017-08-24 NOTE — MR AVS SNAPSHOT
After Visit Summary   8/24/2017    Maribel Osuna    MRN: [id number]           Patient Information     Date Of Birth          1980        Visit Information        Provider Department      8/24/2017 3:45 PM Kan Meeks DPM [facility]        Today's Diagnoses     Onychomycosis    -  1      Care Instructions      DR. MEEKS'S CLINIC LOCATIONS:   MONDAY - [place] TUESDAY - [place]   [address] [facility]  [address]   [place] [address] [place], [place] [address] [phone]       THURSDAY AM - [place] [facility]SD[facility] PM [facility]   [address]   [place] [address] [place], [place] [address] [phone]       FRIDAY AM - [place] SET UP SURGERY: [phone] [address] APPOINTMENTS: [phone]   [place] [address] AFTER HOURS: [phone] FAX NUMBER: [phone]     Follow Up: 2 wks    PLANTAR WARTS   Plantar warts are a viral skin infection. As with most viral infections, there is no cure, only treatment. The virus is also quite superficial, so the immune system cannot recognize it as a problem. Therefore, treatment is aimed at causing an insult that the immune system can recognize. Typically plantar warts are treated by applying liquid nitrogen, to cause a local frostbite injury, or a strong acid, to cause a blister. Sometimes medications or creams that boost immune response are prescribed.     If your treatment was with the strong acid (phenol, tri-chlor, cantharadine), you will likely develop a very tender, brown fluid-filled blister. This is normal and the blister should be allowed to break on its own and dry out. Once it is dried out, use a pumice stone to trim away the dry skin and use an over-the-counter salicylic acid product in between appointments. Call the clinic if you have any concerns regarding your blister.     The regimen between office visits should include: daily  "trimming with the pumice stone, application of the OTC product, and dressing with a small band-aid or piece of duct tape. You should repeat this daily until your next visit in 2-3 weeks. There is a prescription cream as well (Aldera) that can be applied between visits. This can sometimes cause surrounding skin irritation.    Duct tape is a non invasive treatment to warts. Usually requires reapplying it every night. It helps to \"choke out\" the wart. Trimming the wart down with a razor first may also help.     Again, because there is no cure for warts, you may have 6 or more visits depending on how your wart responds. Please call the clinic if you have questions or concerns.     NAIL FUNGUS THERAPY  Antifungal cream/powder (Zeasorb): apply daily to feet and shoes x 2 months  Clean shoes with Lysol or in the washing machine every few weeks  Rotate shoe gear; give them 24 hours to dry out after wearing them  Clean pair of socks in morning, clean pair in afternoon if your feet sweat  Shower shoes should be worn in all public showers/pools      Body Mass Index (BMI)  Many things can cause foot and ankle problems. Foot structure, activity level, foot mechanics and injuries are common causes of pain. One very important issue that often goes unmentioned, is body weight. Extra weight can cause increased stress on muscles, ligaments, bones and tendons. Sometimes just a few extra pounds is all it takes to put one over her/his threshold. Without reducing that stress, it can be difficult to alleviate pain. Some people are uncomfortable addressing this issue, but we feel it is important for you to think about it. As Foot &  Ankle specialists, our job is addressing the lower extremity problem and possible causes. Regarding extra body weight, we encourage patients to discuss diet and weight management plans with their primary care doctors. It is this team approach that gives you the best opportunity for pain relief and getting you " "back on your feet.            Follow-ups after your visit        Who to contact     If you have questions or need follow up information about today's clinic visit or your schedule please contact [facility] directly at [phone].  Normal or non-critical lab and imaging results will be communicated to you by [employer], letter or phone within 4 business days after the clinic has received the results. If you do not hear from us within 7 days, please contact the clinic through [employer] or phone. If you have a critical or abnormal lab result, we will notify you by phone as soon as possible.  Submit refill requests through [employer] or call your pharmacy and they will forward the refill request to us. Please allow 3 business days for your refill to be completed.          Additional Information About Your Visit        [employer] Information     [employer] lets you send messages to your doctor, view your test results, renew your prescriptions, schedule appointments and more. To sign up, go to [email or web address] . Click on \"Log in\" on the left side of the screen, which will take you to the Welcome page. Then click on \"Sign up Now\" on the right side of the page.     You will be asked to enter the access code listed below, as well as some personal information. Please follow the directions to create your username and password.     Your access code is: [email or web address]  Expires: 2017  6:30 AM     Your access code will  in 90 days. If you need help or a new code, please call your [facility] clinic or [phone].        Care EveryWhere ID     This is your Care EveryWhere ID. This could be used by other organizations to access your [facility] medical records  [id number]        Your Vitals Were     Height BMI (Body Mass Index)                5' 5.5\" (1.664 m) 36.38 kg/m2           Blood Pressure from Last 3 Encounters:   17 104/78   17 102/76   17 116/74    Weight from Last 3 Encounters:   17 " 222 lb (100.7 kg)   02/28/17 222 lb 5 oz (100.8 kg)   01/23/17 221 lb 1 oz (100.3 kg)              Today, you had the following     No orders found for display         Today's Medication Changes          These changes are accurate as of: 8/24/17  4:06 PM.  If you have any questions, ask your nurse or doctor.               Start taking these medicines.        Dose/Directions    ciclopirox 0.77 % Gel   Used for:  Onychomycosis   Started by:  Kan Matias DPM        Apply to involved nails daily x 12 months.  Weekly, remove old medication and file nail.   Quantity:  45 g   Refills:  1            Where to get your medicines      These medications were sent to [facility] [address] [facility] [address] AT [facility] [address], [facility] [address]     Phone:  [phone]     ciclopirox 0.77 % Gel                Primary Care Provider Office Phone # Fax #    Ida Murguia [phone] [address] [facility] [address]        Equal Access to Services     MIKI VALDEZ AH: Hadii aad ku hadasho Soomaali, waaxda luqadaha, qaybta kaalmada adeegyada, sophia benitez. So [facility] [phone].    ATENCIÓN: Si habla español, tiene a quinonez disposición servicios gratuitos de asistencia lingüística. Llame al [phone].    We comply with applicable federal civil rights laws and [place] laws. We do not discriminate on the basis of race, color, national origin, age, disability sex, sexual orientation or gender identity.            Thank you!     Thank you for choosing [facility]  for your care. Our goal is always to provide you with excellent care. Hearing back from our patients is one way we can continue to improve our services. Please take a few minutes to complete the written survey that you may receive in the mail after your visit with us. Thank you!             Your Updated Medication List - Protect others  around you: Learn how to safely use, store and throw away your medicines at www.disposemymeds.org.          This list is accurate as of: 8/24/17  4:06 PM.  Always use your most recent med list.                   Brand Name Dispense Instructions for use Diagnosis    ARTIFICIAL TEARS 83-15 % Oint           B-12 1000 MCG Tbcr     100 tablet    Take 100 mg by mouth daily        ciclopirox 0.77 % Gel     45 g    Apply to involved nails daily x 12 months.  Weekly, remove old medication and file nail.    Onychomycosis       fluticasone 50 MCG/ACT spray    FLONASE    1 g    Spray 1-2 sprays into both nostrils daily as needed for rhinitis or allergies    Chronic rhinitis       loratadine 10 MG tablet    CLARITIN    30 tablet    Take 1 tablet (10 mg) by mouth daily    Chronic rhinitis       MELATONIN PO      Take 2.5 mg by mouth At Bedtime        Multi-vitamin Tabs tablet     100 tablet    Take 1 tablet by mouth 3 times daily

## 2017-08-24 NOTE — PATIENT INSTRUCTIONS
DR. MEEKS'S CLINIC LOCATIONS:   MONDAY - [place] TUESDAY - [place]   [address] [facility]  [address]   [place] [address] [place] [address]   [phone]       THURSDAY AM - [place] THURSDAY PM - [place]   [address]   [place] [address] [place] [address]   [phone]       FRIDAY AM - [place] SET UP SURGERY: [phone] [address] APPOINTMENTS: [phone]   [place] [address] AFTER HOURS: [phone] FAX NUMBER: [phone]     Follow Up: 2 wks    PLANTAR WARTS   Plantar warts are a viral skin infection. As with most viral infections, there is no cure, only treatment. The virus is also quite superficial, so the immune system cannot recognize it as a problem. Therefore, treatment is aimed at causing an insult that the immune system can recognize. Typically plantar warts are treated by applying liquid nitrogen, to cause a local frostbite injury, or a strong acid, to cause a blister. Sometimes medications or creams that boost immune response are prescribed.     If your treatment was with the strong acid (phenol, tri-chlor, cantharadine), you will likely develop a very tender, brown fluid-filled blister. This is normal and the blister should be allowed to break on its own and dry out. Once it is dried out, use a pumice stone to trim away the dry skin and use an over-the-counter salicylic acid product in between appointments. Call the clinic if you have any concerns regarding your blister.     The regimen between office visits should include: daily trimming with the pumice stone, application of the OTC product, and dressing with a small band-aid or piece of duct tape. You should repeat this daily until your next visit in 2-3 weeks. There is a prescription cream as well (Aldera) that can be applied between visits. This can sometimes cause surrounding skin irritation.    Duct tape is a non  "invasive treatment to warts. Usually requires reapplying it every night. It helps to \"choke out\" the wart. Trimming the wart down with a razor first may also help.     Again, because there is no cure for warts, you may have 6 or more visits depending on how your wart responds. Please call the clinic if you have questions or concerns.     NAIL FUNGUS THERAPY  Antifungal cream/powder (Zeasorb): apply daily to feet and shoes x 2 months  Clean shoes with Lysol or in the washing machine every few weeks  Rotate shoe gear; give them 24 hours to dry out after wearing them  Clean pair of socks in morning, clean pair in afternoon if your feet sweat  Shower shoes should be worn in all public showers/pools      Body Mass Index (BMI)  Many things can cause foot and ankle problems. Foot structure, activity level, foot mechanics and injuries are common causes of pain. One very important issue that often goes unmentioned, is body weight. Extra weight can cause increased stress on muscles, ligaments, bones and tendons. Sometimes just a few extra pounds is all it takes to put one over her/his threshold. Without reducing that stress, it can be difficult to alleviate pain. Some people are uncomfortable addressing this issue, but we feel it is important for you to think about it. As Foot &  Ankle specialists, our job is addressing the lower extremity problem and possible causes. Regarding extra body weight, we encourage patients to discuss diet and weight management plans with their primary care doctors. It is this team approach that gives you the best opportunity for pain relief and getting you back on your feet.    "

## 2017-08-24 NOTE — NURSING NOTE
"Chief Complaint   Patient presents with     Plantar Wart     L heel, treated in 2015 but never fully went away       Initial /78  Ht 5' 5.5\" (1.664 m)  Wt 222 lb (100.7 kg)  BMI 36.38 kg/m2 Estimated body mass index is 36.38 kg/(m^2) as calculated from the following:    Height as of this encounter: 5' 5.5\" (1.664 m).    Weight as of this encounter: 222 lb (100.7 kg).  Medication Reconciliation: complete  "

## 2017-08-29 ENCOUNTER — TELEPHONE (OUTPATIENT)
Dept: PODIATRY | Facility: CLINIC | Age: 37
End: 2017-08-29

## 2017-08-29 DIAGNOSIS — B35.1 ONYCHOMYCOSIS: Primary | ICD-10-CM

## 2017-08-29 NOTE — TELEPHONE ENCOUNTER
Routed to Dr. Matias to advise.    Gege Ledezma CMA ([facility])  Podiatry/Foot & Ankle Surgery  [facility]

## 2017-08-29 NOTE — TELEPHONE ENCOUNTER
Coverage approval of Ciclopirox 0.77% gel  requires trial of both covered alternatives:  1. Ciclopirox 8% nail lacquer  2. Terbinafine tablets    Rashid ph# [phone]  ID# [id number]  [address] Michelle PATINO ph# [phone]

## 2017-09-01 RX ORDER — CICLOPIROX 80 MG/ML
SOLUTION TOPICAL
Qty: 6.6 ML | Refills: 2 | Status: SHIPPED | OUTPATIENT
Start: 2017-09-01 | End: 2019-01-31

## 2017-09-01 NOTE — TELEPHONE ENCOUNTER
New Rx for penlac lacquer sent to Rd.  I called Maribel and notified her via .    Kan Matias DPM   Podiatric Foot & Ankle Surgeon  [facility]

## 2017-09-08 ENCOUNTER — THERAPY VISIT (OUTPATIENT)
Dept: PHYSICAL THERAPY | Facility: CLINIC | Age: 37
End: 2017-09-08
Payer: COMMERCIAL

## 2017-09-08 DIAGNOSIS — M25.561 RIGHT KNEE PAIN: Primary | ICD-10-CM

## 2017-09-08 PROBLEM — M25.551 RIGHT HIP PAIN: Status: ACTIVE | Noted: 2017-09-08

## 2017-09-08 PROCEDURE — 97161 PT EVAL LOW COMPLEX 20 MIN: CPT | Mod: GP | Performed by: PHYSICAL THERAPIST

## 2017-09-08 PROCEDURE — 97110 THERAPEUTIC EXERCISES: CPT | Mod: GP | Performed by: PHYSICAL THERAPIST

## 2017-09-08 ASSESSMENT — ACTIVITIES OF DAILY LIVING (ADL)
RECREATIONAL_ACTIVITIES: UNABLE TO DO
STANDING_FOR_15_MINUTES: SLIGHT DIFFICULTY
WALKING_APPROXIMATELY_10_MINUTES: MODERATE DIFFICULTY
LIGHT_TO_MODERATE_WORK: SLIGHT DIFFICULTY
GOING_UP_1_FLIGHT_OF_STAIRS: SLIGHT DIFFICULTY
WALKING_UP_STEEP_HILLS: MODERATE DIFFICULTY
WALKING_INITIALLY: NO DIFFICULTY AT ALL
WALKING_15_MINUTES_OR_GREATER: EXTREME DIFFICULTY
HOW_WOULD_YOU_RATE_YOUR_CURRENT_LEVEL_OF_FUNCTION_DURING_YOUR_USUAL_ACTIVITIES_OF_DAILY_LIVING_FROM_0_TO_100_WITH_100_BEING_YOUR_LEVEL_OF_FUNCTION_PRIOR_TO_YOUR_HIP_PROBLEM_AND_0_BEING_THE_INABILITY_TO_PERFORM_ANY_OF_YOUR_USUAL_DAILY_ACTIVITIES?: 50
ROLLING_OVER_IN_BED: NO DIFFICULTY AT ALL
STEPPING_UP_AND_DOWN_CURBS: NO DIFFICULTY AT ALL
TWISTING/PIVOTING_ON_INVOLVED_LEG: UNABLE TO DO
SITTING_FOR_15_MINUTES: NO DIFFICULTY AT ALL
HEAVY_WORK: UNABLE TO DO
HOS_ADL_SCORE(%): 55.88
WALKING_DOWN_STEEP_HILLS: MODERATE DIFFICULTY
GETTING_INTO_AND_OUT_OF_A_BATHTUB: SLIGHT DIFFICULTY
HOS_ADL_COUNT: 17
DEEP_SQUATTING: UNABLE TO DO
HOS_ADL_HIGHEST_POTENTIAL_SCORE: 68
GETTING_INTO_AND_OUT_OF_AN_AVERAGE_CAR: NO DIFFICULTY AT ALL
PUTTING_ON_SOCKS_AND_SHOES: NO DIFFICULTY AT ALL
HOS_ADL_ITEM_SCORE_TOTAL: 38
GOING_DOWN_1_FLIGHT_OF_STAIRS: SLIGHT DIFFICULTY

## 2017-09-08 NOTE — LETTER
[facility] ATHLETIC [facility] PHYSICAL [facility]  [address]  [facility] [address] [phone]    2017    Re: Maribel Osuna   :   1980  MRN:  [id number]   REFERRING PHYSICIAN:   To Torres    [facility] PHYSICAL [facility]    Date of Initial Evaluation:  2017  Visits:  Rxs Used: 1  Reason for Referral:  Right knee pain    EVALUATION SUMMARY    Subjective:  Patient is a 36 year old female presenting with rehab right knee hpi. The history is provided by the patient. No  was used.   Maribel Osuna is a 36 year old female with a right knee condition.  Condition occurred with:  Insidious onset.  Condition occurred: at home.  This is a chronic condition  Pt reports about 3 yrs ago she tried to walk /run some and R hip began to hurt. It took a very long time to recover. 1.5 yrs ago she was standing in tree pose in yoga and felt her right knee pop. This took a very long time to feel better as well. She has lost 300# over the last 3 yrs. She has a goal to walk 4-5 miles a day outside or on the treadmill and do yoga and Pilates. Her knee has popped 3x this past yr and prompted her to see MD on 17 and was referred to PT. She had an xray and MRI recently. Currently she is noting she can't flex her knee fully and can only walk 1-2 blocks before she has throbbing pain. She also reports a problem with her right hip that flares up.    Patient reports pain:  Anterior, medial and posterior.    Pain is described as aching and is constant and reported as 5/10.  Associated symptoms:  Edema. Pain is the same all the time.  Symptoms are exacerbated by activity, walking, bending/squatting, weight bearing, ascending stairs and descending stairs and relieved by nothing.  Since onset symptoms are gradually improving.  Special tests:  MRI and x-ray.                       General health as reported by patient is good.  Pertinent  medical history includes:  Overweight, depression, fibromyalgia, sleep disorder/apnea and other (numbness/tingling and severly overweight over 500 lbs! have lost 300 of it).  Medical allergies: no.  Other surgeries include:  None reported.  Current medications:  Sleep medication, pain medication and other (melatonin, [id number], advil 1x/week).  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  Barriers include:  None as reported by patient.  Red flags:  None as reported by patient.  Knee Activity of Daily Living Score: 28.57          Objective:  System  Hip Evaluation  Hip PROM:        Pain: Pain in R hip with flexion at 90 and,IR.   Hip Strength:    Abduction:  Left: 4+/5     Pain:Right: 4/5    Pain:  Internal Rotation:  Left: 4+/5    Pain:Right: 4+/5   Pain:  External Rotation:  Left: 4+/5   Pain:  Right: 4/5   Pain:  Hip Special Testing:     Right hip positive for the following special tests:  Fadir/Labrum    Functional Testing:  not assessed (too much pain to test SL activity)  Knee Evaluation:  ROM:    AROM  Hyperextension:  Left:  0    Right: 0  Extension:  Left: 0    Right:  0  Flexion: Left: 135    Right: 120  Pain: at end of flexion  Strength:   Extension:  Right: 5-/5    Pain:+  Flexion:  Right: 5-/5   Pain:    Quad Set Left: Fair    Pain:   Ligament Testing:  Normal  Special Tests:   Right knee positive for the following tests:  Patellar Compression  Palpation:    Right knee tenderness present at:  Medial Joint Line; Patellar Tendon and Biceps Femoral  Edema:  Edema of the knee: mild prepatellar.  Mobility Testing:  Not Assessed  Functional Testing:  not assessed    Assessment/Plan:    Patient is a 36 year old female with right side knee complaints.    Patient has the  following significant findings with corresponding treatment plan.                Diagnosis 1:  R knee pain  Pain -  hot/cold therapy, US and manual therapy  Decreased ROM/flexibility - manual therapy and therapeutic exercise  Decreased strength - therapeutic exercise and therapeutic activities  Edema - cold therapy  Impaired muscle performance - neuro re-education  Decreased function - therapeutic activities  Therapy Evaluation Codes:   1) History comprised of:   Personal factors that impact the plan of care:      None.    Comorbidity factors that impact the plan of care are:      None   Medications impacting care: None.  2) Examination of Body Systems comprised of:   Body structures and functions that impact the plan of care:      Knee.   Activity limitations that impact the plan of care are:      Walking.  3) Clinical presentation characteristics are:   Stable/Uncomplicated.  4) Decision-Making    Low complexity using standardized patient assessment instrument and/or measureable assessment of functional outcome.  Cumulative Therapy Evaluation is: Low complexity.  Previous and current functional limitations:  (See Goal Flow Sheet for this information)    Short term and Long term goals: (See Goal Flow Sheet for this information)   Communication ability:  Patient appears to be able to clearly communicate and understand verbal and written communication and follow directions correctly.  Treatment Explanation - The following has been discussed with the patient:   RX ordered/plan of care  Anticipated outcomes  Possible risks and side effects  This patient would benefit from PT intervention to resume normal activities.   Rehab potential is good.    Frequency:  1 X week, once daily  Duration:  for 6 weeks  Discharge Plan:  Achieve all LTG.  Independent in home treatment program.  Reach maximal therapeutic benefit.    Thank you for your referral.    INQUIRIES  Therapist: Ethel Ladd, PT, [facility] [place]  [facility]  [facility] PHYSICAL THERAPY  [address]  [facility] [address]  Phone: [phone]  Fax: [phone]

## 2017-09-08 NOTE — MR AVS SNAPSHOT
"              After Visit Summary   9/8/2017    Maribel Osuna    MRN: [id number]           Patient Information     Date Of Birth          1980        Visit Information        Provider Department      9/8/2017 10:20 AM Ethel Ladd, PT [facility] Athletic Medicine [facility] Physical Therapy        Today's Diagnoses     Right knee pain    -  1       Follow-ups after your visit        Your next 10 appointments already scheduled     Sep 15, 2017 12:20 PM CDT   YANDEL Extremity with Ethel Ladd PT   [facility] Athletic [facility] Physical Therapy (YANDEL [place]  )    [address]  [facility] [address]   [phone]            Sep 22, 2017 10:20 AM CDT   YANDEL Extremity with Ethel Ladd PT   [facility] ([facility]  )    [address]  [facility] [address]   [phone]              Who to contact     If you have questions or need follow up information about today's clinic visit or your schedule please contact [facility] ATHLETIC [facility] PHYSICAL THERAPY directly at [phone].  Normal or non-critical lab and imaging results will be communicated to you by MyChart, letter or phone within 4 business days after the clinic has received the results. If you do not hear from us within 7 days, please contact the clinic through [employer] or phone. If you have a critical or abnormal lab result, we will notify you by phone as soon as possible.  Submit refill requests through [employer] or call your pharmacy and they will forward the refill request to us. Please allow 3 business days for your refill to be completed.          Additional Information About Your Visit        [employer] Information     [employer] lets you send messages to your doctor, view your test results, renew your prescriptions, schedule appointments and more. To sign up, go to [email or web address] . Click on \"Log in\" on the left side of the screen, " "which will take you to the Welcome page. Then click on \"Sign up Now\" on the right side of the page.     You will be asked to enter the access code listed below, as well as some personal information. Please follow the directions to create your username and password.     Your access code is: [email or web address]  Expires: 2017  6:30 AM     Your access code will  in 90 days. If you need help or a new code, please call your [place] clinic or [phone].        Care EveryWhere ID     This is your Care EveryWhere ID. This could be used by other organizations to access your [place] medical records  [id number]         Blood Pressure from Last 3 Encounters:   17 104/78   17 102/76   17 116/74    Weight from Last 3 Encounters:   17 100.7 kg (222 lb)   17 100.8 kg (222 lb 5 oz)   17 100.3 kg (221 lb 1 oz)              We Performed the Following     HC PT EVAL, LOW COMPLEXITY     YANDEL INITIAL EVAL REPORT     THERAPEUTIC EXERCISES        Primary Care Provider Office Phone # Fax #    Ida Murguia [phone] [address] [facility] [address]        Equal Access to Services     MIKI VALDEZ : Hadii kyra martínez hadasho Soomaali, waaxda luqadaha, qaybta kaalmada adeegyada, sophia donis hayyariel salazar . So [facility] [phone].    ATENCIÓN: Si habla español, tiene a quinonez disposición servicios gratuitos de asistencia lingüística. Llame al [phone].    We comply with applicable federal civil rights laws and [place] laws. We do not discriminate on the basis of race, color, national origin, age, disability sex, sexual orientation or gender identity.            Thank you!     Thank you for choosing [facility] PHYSICAL THERAPY  for your care. Our goal is always to provide you with excellent care. Hearing back from our patients is one way we can continue to improve our services. Please take a few minutes to complete the " written survey that you may receive in the mail after your visit with us. Thank you!             Your Updated Medication List - Protect others around you: Learn how to safely use, store and throw away your medicines at www.disposemymeds.org.          This list is accurate as of: 9/8/17  1:27 PM.  Always use your most recent med list.                   Brand Name Dispense Instructions for use Diagnosis    ARTIFICIAL TEARS 83-15 % Oint           B-12 1000 MCG Tbcr     100 tablet    Take 100 mg by mouth daily        * ciclopirox 0.77 % Gel     45 g    Apply to involved nails daily x 12 months.  Weekly, remove old medication and file nail.    Onychomycosis       * ciclopirox 8 % Soln     6.6 mL    Apply daily to involved nails x 1 year.  Once weekly, removed old medication and roughen nail with file.    Onychomycosis       fluticasone 50 MCG/ACT spray    FLONASE    1 g    Spray 1-2 sprays into both nostrils daily as needed for rhinitis or allergies    Chronic rhinitis       loratadine 10 MG tablet    CLARITIN    30 tablet    Take 1 tablet (10 mg) by mouth daily    Chronic rhinitis       MELATONIN PO      Take 2.5 mg by mouth At Bedtime        Multi-vitamin Tabs tablet     100 tablet    Take 1 tablet by mouth 3 times daily        * Notice:  This list has 2 medication(s) that are the same as other medications prescribed for you. Read the directions carefully, and ask your doctor or other care provider to review them with you.

## 2017-09-08 NOTE — PROGRESS NOTES
Subjective:    Patient is a 36 year old female presenting with rehab right knee hpi. The history is provided by the patient. No  was used.   Maribel Osuna is a 36 year old female with a right knee condition.  Condition occurred with:  Insidious onset.  Condition occurred: at home.  This is a chronic condition  Pt reports about 3 yrs ago she tried to walk /run some and R hip began to hurt. It took a very long time to recover. 1.5 yrs ago she was standing in tree pose in yoga and felt her right knee pop. This took a very long time to feel better as well. She has lost 300# over the last 3 yrs. She has a goal to walk 4-5 miles a day outside or on the treadmill and do yoga and Pilates. Her knee has popped 3x this past yr and prompted her to see MD on 9-6-17 and was referred to PT. She had an xray and MRI recently. Currently she is noting she can't flex her knee fully and can only walk 1-2 blocks before she has throbbing pain. She also reports a problem with her right hip that flares up.    Patient reports pain:  Anterior, medial and posterior.    Pain is described as aching and is constant and reported as 5/10.  Associated symptoms:  Edema. Pain is the same all the time.  Symptoms are exacerbated by activity, walking, bending/squatting, weight bearing, ascending stairs and descending stairs and relieved by nothing.  Since onset symptoms are gradually improving.  Special tests:  MRI and x-ray.                                                    Objective:    System                                           Hip Evaluation  Hip PROM:                    Pain: Pain in R hip with flexion at 90 and,IR.         Hip Strength:        Abduction:  Left: 4+/5     Pain:Right: 4/5    Pain:    Internal Rotation:  Left: 4+/5    Pain:Right: 4+/5   Pain:  External Rotation:  Left: 4+/5   Pain:  Right: 4/5   Pain:            Hip Special Testing:       Right hip positive for the following special tests:  Fadir/Labrum       Functional Testing:  not assessed (too much pain to test SL activity)                 Knee Evaluation:  ROM:    AROM    Hyperextension:  Left:  0    Right: 0  Extension:  Left: 0    Right:  0  Flexion: Left: 135    Right: 120    Pain: at end of flexion    Strength:     Extension:  Right: 5-/5    Pain:+  Flexion:  Right: 5-/5   Pain:    Quad Set Left: Fair    Pain:     Ligament Testing:  Normal                Special Tests:     Right knee positive for the following tests:  Patellar Compression  Palpation:      Right knee tenderness present at:  Medial Joint Line; Patellar Tendon and Biceps Femoral  Edema:  Edema of the knee: mild prepatellar.    Mobility Testing:  Not Assessed            Functional Testing:  not assessed                  General     ROS    Assessment/Plan:      Patient is a 36 year old female with right side knee complaints.    Patient has the following significant findings with corresponding treatment plan.                Diagnosis 1:  R knee pain  Pain -  hot/cold therapy, US and manual therapy  Decreased ROM/flexibility - manual therapy and therapeutic exercise  Decreased strength - therapeutic exercise and therapeutic activities  Edema - cold therapy  Impaired muscle performance - neuro re-education  Decreased function - therapeutic activities    Therapy Evaluation Codes:   1) History comprised of:   Personal factors that impact the plan of care:      None.    Comorbidity factors that impact the plan of care are:      None   Medications impacting care: None.  2) Examination of Body Systems comprised of:   Body structures and functions that impact the plan of care:      Knee.   Activity limitations that impact the plan of care are:      Walking.  3) Clinical presentation characteristics are:   Stable/Uncomplicated.  4) Decision-Making    Low complexity using standardized patient assessment instrument and/or measureable assessment of functional outcome.  Cumulative Therapy Evaluation is: Low  complexity.    Previous and current functional limitations:  (See Goal Flow Sheet for this information)    Short term and Long term goals: (See Goal Flow Sheet for this information)     Communication ability:  Patient appears to be able to clearly communicate and understand verbal and written communication and follow directions correctly.  Treatment Explanation - The following has been discussed with the patient:   RX ordered/plan of care  Anticipated outcomes  Possible risks and side effects  This patient would benefit from PT intervention to resume normal activities.   Rehab potential is good.    Frequency:  1 X week, once daily  Duration:  for 6 weeks  Discharge Plan:  Achieve all LTG.  Independent in home treatment program.  Reach maximal therapeutic benefit.    Please refer to the daily flowsheet for treatment today, total treatment time and time spent performing 1:1 timed codes.

## 2017-09-09 ASSESSMENT — ACTIVITIES OF DAILY LIVING (ADL)
KNEE_ACTIVITY_OF_DAILY_LIVING_SCORE: 28.57
PAIN: THE SYMPTOM AFFECTS MY ACTIVITY SEVERELY
GO DOWN STAIRS: ACTIVITY IS VERY DIFFICULT
RAW_SCORE: 20
RISE FROM A CHAIR: ACTIVITY IS FAIRLY DIFFICULT
WALK: ACTIVITY IS VERY DIFFICULT
HOW_WOULD_YOU_RATE_THE_CURRENT_FUNCTION_OF_YOUR_KNEE_DURING_YOUR_USUAL_DAILY_ACTIVITIES_ON_A_SCALE_FROM_0_TO_100_WITH_100_BEING_YOUR_LEVEL_OF_KNEE_FUNCTION_PRIOR_TO_YOUR_INJURY_AND_0_BEING_THE_INABILITY_TO_PERFORM_ANY_OF_YOUR_USUAL_DAILY_ACTIVITIES?: 3
KNEE_ACTIVITY_OF_DAILY_LIVING_SUM: 20
HOW_WOULD_YOU_RATE_THE_OVERALL_FUNCTION_OF_YOUR_KNEE_DURING_YOUR_USUAL_DAILY_ACTIVITIES?: SEVERELY ABNORMAL
SWELLING: THE SYMPTOM AFFECTS MY ACTIVITY SEVERELY
STAND: ACTIVITY IS SOMEWHAT DIFFICULT
KNEEL ON THE FRONT OF YOUR KNEE: I AM UNABLE TO DO THE ACTIVITY
LIMPING: THE SYMPTOM AFFECTS MY ACTIVITY SLIGHTLY
AS_A_RESULT_OF_YOUR_KNEE_INJURY,_HOW_WOULD_YOU_RATE_YOUR_CURRENT_LEVEL_OF_DAILY_ACTIVITY?: SEVERELY ABNORMAL
GIVING WAY, BUCKLING OR SHIFTING OF KNEE: THE SYMPTOM AFFECTS MY ACTIVITY SEVERELY
SQUAT: I AM UNABLE TO DO THE ACTIVITY
GO UP STAIRS: ACTIVITY IS VERY DIFFICULT
STIFFNESS: THE SYMPTOM AFFECTS MY ACTIVITY SEVERELY
WEAKNESS: THE SYMPTOM AFFECTS MY ACTIVITY SLIGHTLY
SIT WITH YOUR KNEE BENT: ACTIVITY IS FAIRLY DIFFICULT

## 2017-09-09 NOTE — PROGRESS NOTES
Subjective:    Patient is a 36 year old female presenting with rehab left ankle/foot hpi.                                    General health as reported by patient is good.  Pertinent medical history includes:  Overweight, depression, fibromyalgia, sleep disorder/apnea and other (numbness/tingling and severly overweight over 500 lbs! have lost 300 of it).  Medical allergies: no.  Other surgeries include:  None reported.  Current medications:  Sleep medication, pain medication and other (melatonin, [id number], advil 1x/week).  Current occupation is Self employed  .    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    Barriers include:  None as reported by patient.    Red flags:  None as reported by patient.           Knee Activity of Daily Living Score: 28.57            Objective:    System    Physical Exam    General     ROS    Assessment/Plan:

## 2017-09-12 ENCOUNTER — OFFICE VISIT (OUTPATIENT)
Dept: PODIATRY | Facility: CLINIC | Age: 37
End: 2017-09-12
Payer: COMMERCIAL

## 2017-09-12 VITALS
HEIGHT: 66 IN | SYSTOLIC BLOOD PRESSURE: 110 MMHG | WEIGHT: 222 LBS | DIASTOLIC BLOOD PRESSURE: 68 MMHG | BODY MASS INDEX: 35.68 KG/M2

## 2017-09-12 DIAGNOSIS — L84 CALLUS: Primary | ICD-10-CM

## 2017-09-12 PROCEDURE — 11055 PARING/CUTG B9 HYPRKER LES 1: CPT | Performed by: PODIATRIST

## 2017-09-12 NOTE — MR AVS SNAPSHOT
After Visit Summary   9/12/2017    Maribel Osuna    MRN: [id number]           Patient Information     Date Of Birth          1980        Visit Information        Provider Department      9/12/2017 8:15 AM Kan Meeks DPM [facility] [place] PODIATRY        Care Instructions      DR. MEEKS'S CLINIC LOCATIONS:   MONDAY - [place] TUESDAY - [place]   [address] [facility]  [address]   [place] [address] [place] [address]   [phone]       THURSDAY AM - [place] THURSDAY PM - [place]   [address]   [place] [address] [place], [place] [address] [phone]       FRIDAY AM - [place] SET UP SURGERY: [phone]   [address] APPOINTMENTS: [phone]   [place] [address] BILLING QUESTIONS: [phone] FAX NUMBER: [phone]     CALLUS / CORN / IPK    When there is excessive friction or pressure on the skin, the body responds by making the skin thicker to protect the deeper structures from becoming exposed. While this works well to protect the deeper structures, the thickened skin can cause increased pressure and pain.    Callus: flat, diffuse thickened areas are simple calluses and they are usually caused by friction. Often these are the result of rubbing on a shoe or from going barefoot.    Corns: calluses with a central core on or between the toes are called corns. These result from prominent joints on toes rubbing together. Theses are a symptom of bone malalignment or illfitting shoes and will always recur unless the underlying bones are addressed surgically.    IPK: calluses with a central core on the ball of the foot are usually IPKs (intractable plantar keratosis). These are caused by excessive pressure from the metatarsals, the bones that make up the ball of the foot. Often one of these bones is too long or too prominent. Again, these will always recur unless the underlying bone  issue is addressed. There is no cure for these. They will either go away by themselves, recur, or more could develop.    Home Treatment  - File: Trim them down with a pumice stone or callus file a couple times a week to remove callus tissue that builds up. An electric callus removing device. Amope Pedi Perfect Electronic Pedicure Foot File and Callus Remover can be a good option.   - Moisturize: Lotion can be applied to soften the callus. A lactic acid or urea based cream such as Carmol, Kersal or Vanicream or thicker cream with shea butter are good options.   - Foot Gear: Good supportive shoes and minimizing barefoot walking can slow down callus formation and can decrease pain levels. Gel inserts can also provide padding to the bottom of the foot to prevent pain and slow recurrence. Toe spacers, toe covers, can custom orthotic inserts can be beneficial as well.  - Surgery: If there is a surgical pathology noted, such as a prominent bone, often this needs to be addressed surgically to minimize recurrence. However, sometimes the lesion simply migrates to another spot after surgery, so it is not a guaranteed cure.     If you cannot treat them yourself at home, call the home foot care nurses below:  [facility]  [phone] [facility]   [phone] [facility]   [phone]                   Body Mass Index (BMI)  Many things can cause foot and ankle problems. Foot structure, activity level, foot mechanics and injuries are common causes of pain. One very important issue that often goes unmentioned, is body weight. Extra weight can cause increased stress on muscles, ligaments, bones and tendons. Sometimes just a few extra pounds is all it takes to put one over her/his threshold. Without reducing that stress, it can be difficult to alleviate pain. Some people are uncomfortable addressing this issue, but we feel it is important for you to think about it. As Foot &  Ankle specialists, our job is addressing the lower  extremity problem and possible causes. Regarding extra body weight, we encourage patients to discuss diet and weight management plans with their primary care doctors. It is this team approach that gives you the best opportunity for pain relief and getting you back on your feet.            Follow-ups after your visit        Your next 10 appointments already scheduled     Sep 15, 2017 12:20 PM CDT   YANDEL Extremity with Ethel Ladd PT   [place] [facility]  [facility]    [address]  [facility] [address]   [phone]            Sep 20, 2017  2:40 PM CDT   (Arrive by 2:25 PM)   RETURN GENERAL with ZAHRAA Cheney [facility] ([facility] [place])    [address] [place] [address]  4th Floor  [facility] [address] [phone]            Sep 22, 2017 10:20 AM CDT   YANDEL Extremity with Ethel Ladd PT   [place] for [facility] Therapy ([facility]  )    [address]  [facility] [address]   [phone]              Who to contact     If you have questions or need follow up information about today's clinic visit or your schedule please contact [facility] PODIATRY directly at [phone].  Normal or non-critical lab and imaging results will be communicated to you by [employer], letter or phone within 4 business days after the clinic has received the results. If you do not hear from us within 7 days, please contact the clinic through MyChart or phone. If you have a critical or abnormal lab result, we will notify you by phone as soon as possible.  Submit refill requests through [employer] or call your pharmacy and they will forward the refill request to us. Please allow 3 business days for your refill to be completed.          Additional Information About Your Visit        [employer] Information     [employer] lets you send messages to your doctor, view your test results, renew your  "prescriptions, schedule appointments and more. To sign up, go to [email or web address] . Click on \"Log in\" on the left side of the screen, which will take you to the Welcome page. Then click on \"Sign up Now\" on the right side of the page.     You will be asked to enter the access code listed below, as well as some personal information. Please follow the directions to create your username and password.     Your access code is: [email or web address]  Expires: 2017  6:30 AM     Your access code will  in 90 days. If you need help or a new code, please call your [place] clinic or [phone].        Care EveryWhere ID     This is your Care EveryWhere ID. This could be used by other organizations to access your [place] medical records  [id number]        Your Vitals Were     Height                   5' 5.5\" (1.664 m)            Blood Pressure from Last 3 Encounters:   17 104/78   17 102/76   17 116/74    Weight from Last 3 Encounters:   17 222 lb (100.7 kg)   17 222 lb 5 oz (100.8 kg)   17 221 lb 1 oz (100.3 kg)              Today, you had the following     No orders found for display       Primary Care Provider Office Phone # Fax #    Ida Inga Murguia [phone] [address] [facility] [address]        Equal Access to Services     [facility] Douglas French CenterROSE AH: Hadii aad ku hadasho Soomaali, waaxda luqadaha, qaybta kaalmada adeegyada, sophia salazar . So [facility] [phone].    ATENCIÓN: Si habla español, tiene a quinonez disposición servicios gratuitos de asistencia lingüística. Llame al [phone].    We comply with applicable federal civil rights laws and [place] laws. We do not discriminate on the basis of race, color, national origin, age, disability sex, sexual orientation or gender identity.            Thank you!     Thank you for choosing [facility] [place] [facility]  for your care. Our goal is always to provide you with excellent " care. Hearing back from our patients is one way we can continue to improve our services. Please take a few minutes to complete the written survey that you may receive in the mail after your visit with us. Thank you!             Your Updated Medication List - Protect others around you: Learn how to safely use, store and throw away your medicines at www.disposemymeds.org.          This list is accurate as of: 9/12/17  8:32 AM.  Always use your most recent med list.                   Brand Name Dispense Instructions for use Diagnosis    ARTIFICIAL TEARS 83-15 % Oint           B-12 1000 MCG Tbcr     100 tablet    Take 100 mg by mouth daily        * ciclopirox 0.77 % Gel     45 g    Apply to involved nails daily x 12 months.  Weekly, remove old medication and file nail.    Onychomycosis       * ciclopirox 8 % Soln     6.6 mL    Apply daily to involved nails x 1 year.  Once weekly, removed old medication and roughen nail with file.    Onychomycosis       fluticasone 50 MCG/ACT spray    FLONASE    1 g    Spray 1-2 sprays into both nostrils daily as needed for rhinitis or allergies    Chronic rhinitis       loratadine 10 MG tablet    CLARITIN    30 tablet    Take 1 tablet (10 mg) by mouth daily    Chronic rhinitis       MELATONIN PO      Take 2.5 mg by mouth At Bedtime        Multi-vitamin Tabs tablet     100 tablet    Take 1 tablet by mouth 3 times daily        * Notice:  This list has 2 medication(s) that are the same as other medications prescribed for you. Read the directions carefully, and ask your doctor or other care provider to review them with you.

## 2017-09-12 NOTE — PATIENT INSTRUCTIONS
DR. MEEKS'S CLINIC LOCATIONS:   MONDAY - [place] TUESDAY - [place]   [address] [facility]  [address]   [place] [address] [place] [address]   [phone]       THURSDAY AM - [place] THURSDAY PM - [place]   [address]   [place] [address] [place], [place] [address] [phone]       FRIDAY AM - [place] SET UP SURGERY: [phone] [address] APPOINTMENTS: [phone]   [place] [address] BILLING QUESTIONS: [phone] FAX NUMBER: [phone]     CALLUS / CORN / IPK    When there is excessive friction or pressure on the skin, the body responds by making the skin thicker to protect the deeper structures from becoming exposed. While this works well to protect the deeper structures, the thickened skin can cause increased pressure and pain.    Callus: flat, diffuse thickened areas are simple calluses and they are usually caused by friction. Often these are the result of rubbing on a shoe or from going barefoot.    Corns: calluses with a central core on or between the toes are called corns. These result from prominent joints on toes rubbing together. Theses are a symptom of bone malalignment or illfitting shoes and will always recur unless the underlying bones are addressed surgically.    IPK: calluses with a central core on the ball of the foot are usually IPKs (intractable plantar keratosis). These are caused by excessive pressure from the metatarsals, the bones that make up the ball of the foot. Often one of these bones is too long or too prominent. Again, these will always recur unless the underlying bone issue is addressed. There is no cure for these. They will either go away by themselves, recur, or more could develop.    Home Treatment  - File: Trim them down with a pumice stone or callus file a couple times a week to remove callus tissue that builds up. An electric callus removing device. Amope Pedi  Perfect Electronic Pedicure Foot File and Callus Remover can be a good option.   - Moisturize: Lotion can be applied to soften the callus. A lactic acid or urea based cream such as Carmol, Kersal or Vanicream or thicker cream with shea butter are good options.   - Foot Gear: Good supportive shoes and minimizing barefoot walking can slow down callus formation and can decrease pain levels. Gel inserts can also provide padding to the bottom of the foot to prevent pain and slow recurrence. Toe spacers, toe covers, can custom orthotic inserts can be beneficial as well.  - Surgery: If there is a surgical pathology noted, such as a prominent bone, often this needs to be addressed surgically to minimize recurrence. However, sometimes the lesion simply migrates to another spot after surgery, so it is not a guaranteed cure.     If you cannot treat them yourself at home, call the home foot care nurses below:  [facility]  [phone] [facility]   [phone] [facility]   [phone]                   Body Mass Index (BMI)  Many things can cause foot and ankle problems. Foot structure, activity level, foot mechanics and injuries are common causes of pain. One very important issue that often goes unmentioned, is body weight. Extra weight can cause increased stress on muscles, ligaments, bones and tendons. Sometimes just a few extra pounds is all it takes to put one over her/his threshold. Without reducing that stress, it can be difficult to alleviate pain. Some people are uncomfortable addressing this issue, but we feel it is important for you to think about it. As Foot &  Ankle specialists, our job is addressing the lower extremity problem and possible causes. Regarding extra body weight, we encourage patients to discuss diet and weight management plans with their primary care doctors. It is this team approach that gives you the best opportunity for pain relief and getting you back on your feet.

## 2017-09-12 NOTE — PROGRESS NOTES
"Foot & Ankle Surgery   September 12, 2017    S:  Pt is seen today for evaluation of L heel lesion.  Was treated as a wart last visit, has a blood blister today.  She's not having any pain with the lesion    Vitals:    09/12/17 0822   Height: 5' 5.5\" (1.664 m)   '      ROS - Pos for CC.  Patient denies current nausea, vomiting, chills, fevers, belly pain, calf pain, chest pain or SOB.  Complete remainder of ROS it otherwise neg.      PE:  Gen:   No apparent distress  Neuro:   A&Ox3, no deficits  Psych:    Answering questions appropriately for age and situation with normal affect  Head:    NCAT  Eye:    Visual scanning without deficit  Ear:    Response to auditory stimuli wnl  Lung:    Non-labored breathing on RA noted  Abd:    NTND per patient report  Lymph:    Neg for pitting/non-pitting edema BLE  Vasc:    Pulses palpable, CFT minimally delayed  Neuro:    Light touch sensation intact to all sensory nerve distributions without paresthesias  Derm:    Large dried blood blister plantar L heel without inflammation or SOI.  No drainage.  Skin lines are visible through the blood blister.  The callus tissue was debrided and I partially removed the blood blister, and again skin lines are visible.  MSK:    ROM, strength wnl without limitation, no pain on palpation noted.  Calf:    Neg for redness, swelling or tenderness      Assessment:  36 year old female with skin lesion plantar heel      Plan:  Discussed etiologies/options  1.  Skin lesion plantar heel  -callus debrided with 15 blade without incident  -skin lines visible, doesn't appear to be a wart lesion  -no treatment  -continue to monitor; follow up for re-evaluation with any acute changes  -home callus handout dispensed for treatment    Follow up:  prn or sooner with acute issues      There is no height or weight on file to calculate BMI.  Weight management plan: Patient was referred to their PCP to discuss a diet and exercise plan.         Kan Matias DPM "   Podiatric Foot & Ankle Surgeon  [facility]  [phone]

## 2017-09-12 NOTE — LETTER
"    9/12/2017         RE: Maribel Osuna  [address]  [place] [address]        Dear Colleague,    Thank you for referring your patient, Maribel Osuna, to the [facility]. Please see a copy of my visit note below.    Foot & Ankle Surgery   September 12, 2017    S:  Pt is seen today for evaluation of L heel lesion.  Was treated as a wart last visit, has a blood blister today.  She's not having any pain with the lesion    Vitals:    09/12/17 0822   Height: 5' 5.5\" (1.664 m)   '      ROS - Pos for CC.  Patient denies current nausea, vomiting, chills, fevers, belly pain, calf pain, chest pain or SOB.  Complete remainder of ROS it otherwise neg.      PE:  Gen:   No apparent distress  Neuro:   A&Ox3, no deficits  Psych:    Answering questions appropriately for age and situation with normal affect  Head:    NCAT  Eye:    Visual scanning without deficit  Ear:    Response to auditory stimuli wnl  Lung:    Non-labored breathing on RA noted  Abd:    NTND per patient report  Lymph:    Neg for pitting/non-pitting edema BLE  Vasc:    Pulses palpable, CFT minimally delayed  Neuro:    Light touch sensation intact to all sensory nerve distributions without paresthesias  Derm:    Large dried blood blister plantar L heel without inflammation or SOI.  No drainage.  Skin lines are visible through the blood blister.  The callus tissue was debrided and I partially removed the blood blister, and again skin lines are visible.  MSK:    ROM, strength wnl without limitation, no pain on palpation noted.  Calf:    Neg for redness, swelling or tenderness      Assessment:  36 year old female with skin lesion plantar heel      Plan:  Discussed etiologies/options  1.  Skin lesion plantar heel  -callus debrided with 15 blade without incident  -skin lines visible, doesn't appear to be a wart lesion  -no treatment  -continue to monitor; follow up for re-evaluation with any acute changes  -home callus handout dispensed for " treatment    Follow up:  prn or sooner with acute issues      There is no height or weight on file to calculate BMI.  Weight management plan: Patient was referred to their PCP to discuss a diet and exercise plan.         Kan Matias DPM   Podiatric Foot & Ankle Surgeon  [facility]  [phone]      Again, thank you for allowing me to participate in the care of your patient.        Sincerely,        Kan Matias DPM, CHRISTIN

## 2017-09-12 NOTE — NURSING NOTE
"Chief Complaint   Patient presents with     Plantar Wart     L foot       Initial /68  Ht 5' 5.5\" (1.664 m)  Wt 222 lb (100.7 kg)  BMI 36.38 kg/m2 Estimated body mass index is 36.38 kg/(m^2) as calculated from the following:    Height as of this encounter: 5' 5.5\" (1.664 m).    Weight as of this encounter: 222 lb (100.7 kg).  Medication Reconciliation: complete  "

## 2017-09-15 ENCOUNTER — THERAPY VISIT (OUTPATIENT)
Dept: PHYSICAL THERAPY | Facility: CLINIC | Age: 37
End: 2017-09-15
Payer: COMMERCIAL

## 2017-09-15 DIAGNOSIS — M25.551 RIGHT HIP PAIN: ICD-10-CM

## 2017-09-15 DIAGNOSIS — M25.561 CHRONIC PAIN OF RIGHT KNEE: ICD-10-CM

## 2017-09-15 DIAGNOSIS — G89.29 CHRONIC PAIN OF RIGHT KNEE: ICD-10-CM

## 2017-09-15 PROCEDURE — 97112 NEUROMUSCULAR REEDUCATION: CPT | Mod: GP | Performed by: PHYSICAL THERAPIST

## 2017-09-15 PROCEDURE — 97110 THERAPEUTIC EXERCISES: CPT | Mod: GP | Performed by: PHYSICAL THERAPIST

## 2017-09-20 ENCOUNTER — OFFICE VISIT (OUTPATIENT)
Dept: OPHTHALMOLOGY | Facility: CLINIC | Age: 37
End: 2017-09-20

## 2017-09-20 DIAGNOSIS — H52.13 MYOPIA, BILATERAL: Primary | ICD-10-CM

## 2017-09-20 ASSESSMENT — REFRACTION_CURRENTRX
OD_DIAMETER: 14.3
OS_CYLINDER: -2.25
OD_SPHERE: -0.25
OS_AXIS: 090
OS_BASECURVE: 8.6
OS_SPHERE: -0.75
OD_AXIS: 100
OS_BRAND: CLARITI 1-DAY TORIC
OS_DIAMETER: 14.3
OD_BRAND: CLARITI 1-DAY TORIC
OD_CYLINDER: -1.75
OD_BASECURVE: 8.6

## 2017-09-20 ASSESSMENT — VISUAL ACUITY
METHOD: SNELLEN - LINEAR
OS_CC: 20/20
OD_CC: 20/20

## 2017-09-20 ASSESSMENT — REFRACTION_WEARINGRX
OS_AXIS: 155
OS_SPHERE: -3.00
OS_CYLINDER: +2.25
OD_AXIS: 017
OD_SPHERE: -2.25
OD_CYLINDER: +2.00

## 2017-09-20 NOTE — MR AVS SNAPSHOT
After Visit Summary   2017    Maribel Osuna    MRN: [id number]           Patient Information     Date Of Birth          1980        Visit Information        Provider Department      2017 2:40 PM Mark Reid OD [facility]        Today's Diagnoses     Myopia, bilateral    -  1       Follow-ups after your visit        Follow-up notes from your care team     Return if symptoms worsen or fail to improve, for Contact Lens Follow-up.      Your next 10 appointments already scheduled     Sep 22, 2017 10:20 AM CDT   YANDEL Extremity with Ethel Ladd, PT   [facility] ([facility]  )    [address]  [facility] [address] [phone]              Who to contact     Please call your clinic at [phone] to:    Ask questions about your health    Make or cancel appointments    Discuss your medicines    Learn about your test results    Speak to your doctor   If you have compliments or concerns about an experience at your clinic, or if you wish to file a complaint, please contact [facility] Patient Relations at [phone] or email us at [email or web address]         Additional Information About Your Visit        MyChart Information     [employer] is an electronic gateway that provides easy, online access to your medical records. With [employer], you can request a clinic appointment, read your test results, renew a prescription or communicate with your care team.     To sign up for C[employer] Connectionst visit the website at [email or web address]   You will be asked to enter the access code listed below, as well as some personal information. Please follow the directions to create your username and password.     Your access code is: [email or web address]  Expires: 2017  6:30 AM     Your access code will  in 90 days. If you need help or a new code, please contact your [facility] or call [phone] for assistance.        Care EveryWhere ID     This is your Care EveryWhere ID. This could be used by other organizations to access your [facility] medical records  [id number]         Blood Pressure from Last 3 Encounters:   09/12/17 110/68   08/24/17 104/78   02/28/17 102/76    Weight from Last 3 Encounters:   09/12/17 100.7 kg (222 lb)   08/24/17 100.7 kg (222 lb)   02/28/17 100.8 kg (222 lb 5 oz)              Today, you had the following     No orders found for display       Primary Care Provider Office Phone # Fax #    Ida Murguia [phone] [address] [facility] [address]        Equal Access to Services     MIKI VALDEZ : Hadii kyra kinney Soyola, waaxda luqadaha, qaybta kaalmada nisha, sophia salazar . So [facility] [phone].    ATENCIÓN: Si habla español, tiene a quinonez disposición servicios gratuitos de asistencia lingüística. Kaylan al [phone].    We comply with applicable federal civil rights laws and [place] laws. We do not discriminate on the basis of race, color, national origin, age, disability sex, sexual orientation or gender identity.            Thank you!     Thank you for choosing [facility] OPHTHAL[facility]  for your care. Our goal is always to provide you with excellent care. Hearing back from our patients is one way we can continue to improve our services. Please take a few minutes to complete the written survey that you may receive in the mail after your visit with us. Thank you!             Your Updated Medication List - Protect others around you: Learn how to safely use, store and throw away your medicines at www.disposemymeds.org.          This list is accurate as of: 9/20/17  3:13 PM.  Always use your most recent med list.                   Brand Name Dispense Instructions for use Diagnosis    ARTIFICIAL TEARS 83-15 % Oint           B-12 1000 MCG Tbcr     100 tablet    Take 100 mg  by mouth daily        * ciclopirox 0.77 % Gel     45 g    Apply to involved nails daily x 12 months.  Weekly, remove old medication and file nail.    Onychomycosis       * ciclopirox 8 % Soln     6.6 mL    Apply daily to involved nails x 1 year.  Once weekly, removed old medication and roughen nail with file.    Onychomycosis       fluticasone 50 MCG/ACT spray    FLONASE    1 g    Spray 1-2 sprays into both nostrils daily as needed for rhinitis or allergies    Chronic rhinitis       loratadine 10 MG tablet    CLARITIN    30 tablet    Take 1 tablet (10 mg) by mouth daily    Chronic rhinitis       MELATONIN PO      Take 2.5 mg by mouth At Bedtime        Multi-vitamin Tabs tablet     100 tablet    Take 1 tablet by mouth 3 times daily        * Notice:  This list has 2 medication(s) that are the same as other medications prescribed for you. Read the directions carefully, and ask your doctor or other care provider to review them with you.

## 2017-09-20 NOTE — PROGRESS NOTES
Assessment/Plan  (H52.13) Myopia, bilateral  (primary encounter diagnosis)  Comment: Myopic astigmatism OU  Plan:  Poor vision OS in Clariti 1-Day lenses. 1-Day Oasys for Astigmatism ordered. Patient to be called upon receipt of lenses.      Complete documentation of historical and exam elements from today's encounter can  be found in the full encounter summary report (not reduplicated in this progress  note). I personally obtained the chief complaint(s) and history of present illness. I  confirmed and edited as necessary the review of systems, past medical/surgical  history, family history, social history, and examination findings as documented by  others; and I examined the patient myself. I personally reviewed the relevant tests,  images, and reports as documented above. I formulated and edited as necessary the  assessment and plan and discussed the findings and management plan with the  patient and family.    Mark Reid, ZAHRAA, FAAO

## 2017-09-22 ENCOUNTER — THERAPY VISIT (OUTPATIENT)
Dept: PHYSICAL THERAPY | Facility: CLINIC | Age: 37
End: 2017-09-22
Payer: COMMERCIAL

## 2017-09-22 DIAGNOSIS — M25.561 CHRONIC PAIN OF RIGHT KNEE: ICD-10-CM

## 2017-09-22 DIAGNOSIS — G89.29 CHRONIC PAIN OF RIGHT KNEE: ICD-10-CM

## 2017-09-22 DIAGNOSIS — M25.551 RIGHT HIP PAIN: ICD-10-CM

## 2017-09-22 PROCEDURE — 97112 NEUROMUSCULAR REEDUCATION: CPT | Mod: GP | Performed by: PHYSICAL THERAPIST

## 2017-09-22 PROCEDURE — 97110 THERAPEUTIC EXERCISES: CPT | Mod: GP | Performed by: PHYSICAL THERAPIST

## 2017-09-27 ENCOUNTER — THERAPY VISIT (OUTPATIENT)
Dept: PHYSICAL THERAPY | Facility: CLINIC | Age: 37
End: 2017-09-27
Payer: COMMERCIAL

## 2017-09-27 DIAGNOSIS — M25.561 CHRONIC PAIN OF RIGHT KNEE: ICD-10-CM

## 2017-09-27 DIAGNOSIS — G89.29 CHRONIC PAIN OF RIGHT KNEE: ICD-10-CM

## 2017-09-27 DIAGNOSIS — M25.551 RIGHT HIP PAIN: ICD-10-CM

## 2017-09-27 PROCEDURE — 97112 NEUROMUSCULAR REEDUCATION: CPT | Mod: GP | Performed by: PHYSICAL THERAPIST

## 2017-09-27 PROCEDURE — 97110 THERAPEUTIC EXERCISES: CPT | Mod: GP | Performed by: PHYSICAL THERAPIST

## 2017-10-02 ENCOUNTER — TELEPHONE (OUTPATIENT)
Dept: OPHTHALMOLOGY | Facility: CLINIC | Age: 37
End: 2017-10-02

## 2017-10-02 NOTE — TELEPHONE ENCOUNTER
Ordered CTL for patient, received shipment today, CTL were not shipped, will ship again when in stock    Dolly Alcantar October 2, 2017 3:41 PM

## 2017-10-20 ENCOUNTER — THERAPY VISIT (OUTPATIENT)
Dept: PHYSICAL THERAPY | Facility: CLINIC | Age: 37
End: 2017-10-20
Payer: COMMERCIAL

## 2017-10-20 DIAGNOSIS — M25.561 CHRONIC PAIN OF RIGHT KNEE: ICD-10-CM

## 2017-10-20 DIAGNOSIS — G89.29 CHRONIC PAIN OF RIGHT KNEE: ICD-10-CM

## 2017-10-20 DIAGNOSIS — M25.551 RIGHT HIP PAIN: ICD-10-CM

## 2017-10-20 PROCEDURE — 97110 THERAPEUTIC EXERCISES: CPT | Mod: GP | Performed by: PHYSICAL THERAPIST

## 2017-10-20 PROCEDURE — 97112 NEUROMUSCULAR REEDUCATION: CPT | Mod: GP | Performed by: PHYSICAL THERAPIST

## 2017-12-13 ENCOUNTER — THERAPY VISIT (OUTPATIENT)
Dept: PHYSICAL THERAPY | Facility: CLINIC | Age: 37
End: 2017-12-13
Payer: COMMERCIAL

## 2017-12-13 DIAGNOSIS — M25.551 RIGHT HIP PAIN: ICD-10-CM

## 2017-12-13 DIAGNOSIS — M25.561 RIGHT KNEE PAIN: ICD-10-CM

## 2017-12-13 PROCEDURE — 97112 NEUROMUSCULAR REEDUCATION: CPT | Mod: GP | Performed by: PHYSICAL THERAPIST

## 2017-12-13 PROCEDURE — 97110 THERAPEUTIC EXERCISES: CPT | Mod: GP | Performed by: PHYSICAL THERAPIST

## 2017-12-13 NOTE — LETTER
[facility] ATHLETIC [facility] PHYSICAL [facility]  [address] [facility] [address]  [facility] [id number] [phone]    2017    Re: Maribel Osuna   :   1980  MRN:  [id number]   REFERRING PHYSICIAN:   To Torres    [facility] ATHLETIC [facility]    Date of Initial Evaluation:  2017  Visits:6    Reason for Referral:     Right knee pain  Right hip pain    PROGRESS  REPORT    Progress reporting period is from 17 to 17.       SUBJECTIVE    Subjective: Pt reports she has noted slow improvement in her knee pain but she has to keep it taped everyday and if she flexes the knee deeply in sitting it hurts     Current Pain level: 1/10.     Previous pain level was  5/10  .   Changes in function:  Yes (See Goal flowsheet attached for changes in current functional level)  Adverse reaction to treatment or activity: None    OBJECTIVE  Changes noted in objective findings:  The objective findings below are from DOS 17.  Objective: Has progressed the eliptical to 35 minutes at an easy pace. Has continued the PT exs. Walking is still painful. Yoga hurts.Overall she feels she is not very functional. Muscle strength is 5/5 for all hip muscles. Taping helps her pain but she needs to use it all the time. She will make a f/u visit with her MD to discuss the situation. If her sx improve she will return to me for us to advance the exercises if possible     ASSESSMENT/PLAN  Updated problem list and treatment plan: Diagnosis 1:  R knee pain  Pain -  hot/cold therapy and manual therapy  Edema - cold therapy  Impaired muscle performance - neuro re-education  Decreased function - therapeutic activities  STG/LTGs have been met or progress has been made towards goals:  Yes (See Goal flow sheet completed today.)  Assessment of Progress: The patient's progress has plateaued.  Self Management Plans:  Patient has been instructed in a home treatment program.  I have  re-evaluated this patient and find that the nature, scope, duration and intensity of the therapy is appropriate for the medical condition of the patient.  Maribel continues to require the following intervention to meet STG and LTG's:  PT    Recommendations:  This patient would benefit from continued therapy.     Frequency:  1 X a month, once daily  Duration:  for 3 months            Thank you for your referral.    INQUIRIES  Therapist: Ethel Ladd PT, [facility]  [facility]  [address] [place] [address] [phone]  [facility] [address]  Phone: [phone]  Fax: [phone]

## 2017-12-13 NOTE — PROGRESS NOTES
[facility] Evaluation  Subjective:    HPI                    Objective:    System    Physical Exam    General     ROS    Assessment/Plan:      PROGRESS  REPORT    Progress reporting period is from 9-27-17 to 12-13-17.       SUBJECTIVE    Subjective: Pt reports she has noted slow improvement in her knee pain but she has to keep it taped everyday and if she flexes the knee deeply in sitting it hurts     Current Pain level: 1/10.     Previous pain level was  5/10  .   Changes in function:  Yes (See Goal flowsheet attached for changes in current functional level)  Adverse reaction to treatment or activity: None    OBJECTIVE  Changes noted in objective findings:  The objective findings below are from DOS 12-13-17.  Objective: Has progressed the eliptical to 35 minutes at an easy pace. Has continued the PT exs. Walking is still painful. Yoga hurts.Overall she feels she is not very functional. Muscle strength is 5/5 for all hip muscles. Taping helps her pain but she needs to use it all the time. She will make a f/u visit with her MD to discuss the situation. If her sx improve she will return to me for us to advance the exercises if possible     ASSESSMENT/PLAN  Updated problem list and treatment plan: Diagnosis 1:  R knee pain  Pain -  hot/cold therapy and manual therapy  Edema - cold therapy  Impaired muscle performance - neuro re-education  Decreased function - therapeutic activities  STG/LTGs have been met or progress has been made towards goals:  Yes (See Goal flow sheet completed today.)  Assessment of Progress: The patient's progress has plateaued.  Self Management Plans:  Patient has been instructed in a home treatment program.  I have re-evaluated this patient and find that the nature, scope, duration and intensity of the therapy is appropriate for the medical condition of the patient.  Maribel continues to require the following intervention to meet STG and LTG's:  PT    Recommendations:  This  patient would benefit from continued therapy.     Frequency:  1 X a month, once daily  Duration:  for 3 months          Please refer to the daily flowsheet for treatment today, total treatment time and time spent performing 1:1 timed codes.

## 2017-12-13 NOTE — MR AVS SNAPSHOT
"              After Visit Summary   2017    Maribel Osuna    MRN: [id number]           Patient Information     Date Of Birth          1980        Visit Information        Provider Department      2017 12:10 PM Ethel Ladd PT [facility]        Today's Diagnoses     Right knee pain        Right hip pain           Follow-ups after your visit        Who to contact     If you have questions or need follow up information about today's clinic visit or your schedule please contact [facility] directly at [phone].  Normal or non-critical lab and imaging results will be communicated to you by SEC Watch[employer], letter or phone within 4 business days after the clinic has received the results. If you do not hear from us within 7 days, please contact the clinic through [employer] Watch[employer] or phone. If you have a critical or abnormal lab result, we will notify you by phone as soon as possible.  Submit refill requests through [employer] or call your pharmacy and they will forward the refill request to us. Please allow 3 business days for your refill to be completed.          Additional Information About Your Visit        MyChart Information     [employer] lets you send messages to your doctor, view your test results, renew your prescriptions, schedule appointments and more. To sign up, go to [email or web address] . Click on \"Log in\" on the left side of the screen, which will take you to the Welcome page. Then click on \"Sign up Now\" on the right side of the page.     You will be asked to enter the access code listed below, as well as some personal information. Please follow the directions to create your username and password.     Your access code is: [email or web address]  Expires: 3/13/2018  3:05 PM     Your access code will  in 90 days. If you need help or a new code, please call your [facility] clinic or [phone].        Care " EveryWhere ID     This is your Care EveryWhere ID. This could be used by other organizations to access your [facility] medical records  [id number]         Blood Pressure from Last 3 Encounters:   09/12/17 110/68   08/24/17 104/78   02/28/17 102/76    Weight from Last 3 Encounters:   09/12/17 100.7 kg (222 lb)   08/24/17 100.7 kg (222 lb)   02/28/17 100.8 kg (222 lb 5 oz)              We Performed the Following     [facility] PROGRESS NOTES REPORT     NEUROMUSCULAR RE-EDUCATION     THERAPEUTIC EXERCISES        Primary Care Provider Office Phone # Fax #    Ida Murguia [phone] [address] [facility] [address]        Equal Access to Services     MIKI VALDEZ : Hadii kyra martínez hadasho Soomaali, waaxda luqadaha, qaybta kaalmada adeegyada, waxluisa moranin misael salazar . So [facility] [phone].    ATENCIÓN: Si habla español, tiene a quinonez disposición servicios gratuitos de asistencia lingüística. [facility] [phone].    We comply with applicable federal civil rights laws and [place] laws. We do not discriminate on the basis of race, color, national origin, age, disability, sex, sexual orientation, or gender identity.            Thank you!     Thank you for choosing [facility] PH[facility]  for your care. Our goal is always to provide you with excellent care. Hearing back from our patients is one way we can continue to improve our services. Please take a few minutes to complete the written survey that you may receive in the mail after your visit with us. Thank you!             Your Updated Medication List - Protect others around you: Learn how to safely use, store and throw away your medicines at www.disposemymeds.org.          This list is accurate as of: 12/13/17  3:05 PM.  Always use your most recent med list.                   Brand Name Dispense Instructions for use Diagnosis    ARTIFICIAL TEARS 83-15 % Oint           B-12 1000 MCG Tbcr      100 tablet    Take 100 mg by mouth daily        * ciclopirox 0.77 % Gel     45 g    Apply to involved nails daily x 12 months.  Weekly, remove old medication and file nail.    Onychomycosis       * ciclopirox 8 % Soln     6.6 mL    Apply daily to involved nails x 1 year.  Once weekly, removed old medication and roughen nail with file.    Onychomycosis       fluticasone 50 MCG/ACT spray    FLONASE    1 g    Spray 1-2 sprays into both nostrils daily as needed for rhinitis or allergies    Chronic rhinitis       loratadine 10 MG tablet    CLARITIN    30 tablet    Take 1 tablet (10 mg) by mouth daily    Chronic rhinitis       MELATONIN PO      Take 2.5 mg by mouth At Bedtime        Multi-vitamin Tabs tablet     100 tablet    Take 1 tablet by mouth 3 times daily        * Notice:  This list has 2 medication(s) that are the same as other medications prescribed for you. Read the directions carefully, and ask your doctor or other care provider to review them with you.

## 2018-03-06 DIAGNOSIS — J31.0 CHRONIC RHINITIS: ICD-10-CM

## 2018-03-06 NOTE — TELEPHONE ENCOUNTER
"Requested Prescriptions   Pending Prescriptions Disp Refills     loratadine (CLARITIN) 10 MG tablet [Pharmacy Med Name: LORATADINE 10MG TABLETS] 100 tablet 0     Sig: TAKE 1 TABLET(10 MG) BY MOUTH DAILY    Antihistamines Protocol Failed    3/6/2018 10:28 AM       Failed - Recent (12 mo) or future (30 days) visit within the authorizing provider's specialty    Patient had office visit in the last year or has a visit in the next 30 days with authorizing provider.  See \"Patient Info\" tab in inbasket, or \"Choose Columns\" in Meds & Orders section of the refill encounter.            Passed - Patient is 3-64 years of age    Apply weight-based dosing for peds patients age 3 - 12 years of age.    Forward request to provider for patients under the age of 3 or over the age of 64.          fluticasone (FLONASE) 50 MCG/ACT spray [Pharmacy Med Name: FLUTICASONE 50MCG NASAL SP (120) RX] 16 mL 0     Sig: SHAKE LIQUID AND USE 1 TO 2 SPRAYS IN EACH NOSTRIL DAILY AS NEEDED FOR RHINITIS OR ALLERGIES    Inhaled Steroids Protocol Failed    3/6/2018 10:28 AM       Failed - Recent (12 mo) or future (30 days) visit within the authorizing provider's specialty    Patient had office visit in the last year or has a visit in the next 30 days with authorizing provider.  See \"Patient Info\" tab in inbasket, or \"Choose Columns\" in Meds & Orders section of the refill encounter.            Passed - Patient is age 12 or older          "

## 2018-03-08 RX ORDER — FLUTICASONE PROPIONATE 50 MCG
SPRAY, SUSPENSION (ML) NASAL
Qty: 16 ML | Refills: 0 | Status: SHIPPED | OUTPATIENT
Start: 2018-03-08 | End: 2018-05-17

## 2018-03-08 RX ORDER — LORATADINE 10 MG/1
TABLET ORAL
Qty: 30 TABLET | Refills: 0 | Status: SHIPPED | OUTPATIENT
Start: 2018-03-08 | End: 2018-03-27

## 2018-03-22 ENCOUNTER — TELEPHONE (OUTPATIENT)
Dept: FAMILY MEDICINE | Facility: CLINIC | Age: 38
End: 2018-03-22

## 2018-03-22 ENCOUNTER — OFFICE VISIT (OUTPATIENT)
Dept: FAMILY MEDICINE | Facility: CLINIC | Age: 38
End: 2018-03-22
Payer: COMMERCIAL

## 2018-03-22 ENCOUNTER — RADIANT APPOINTMENT (OUTPATIENT)
Dept: GENERAL RADIOLOGY | Facility: CLINIC | Age: 38
End: 2018-03-22
Attending: FAMILY MEDICINE
Payer: COMMERCIAL

## 2018-03-22 VITALS
WEIGHT: 255.2 LBS | TEMPERATURE: 98.9 F | HEART RATE: 102 BPM | BODY MASS INDEX: 41.82 KG/M2 | DIASTOLIC BLOOD PRESSURE: 89 MMHG | RESPIRATION RATE: 16 BRPM | SYSTOLIC BLOOD PRESSURE: 132 MMHG | OXYGEN SATURATION: 100 %

## 2018-03-22 DIAGNOSIS — R07.81 RIB PAIN ON LEFT SIDE: ICD-10-CM

## 2018-03-22 DIAGNOSIS — R94.31 ABNORMAL ELECTROCARDIOGRAM: ICD-10-CM

## 2018-03-22 DIAGNOSIS — F41.9 ANXIETY: Primary | ICD-10-CM

## 2018-03-22 DIAGNOSIS — M94.0 COSTOCHONDRITIS: ICD-10-CM

## 2018-03-22 LAB
ERYTHROCYTE [DISTWIDTH] IN BLOOD BY AUTOMATED COUNT: 13.6 % (ref 10–15)
HCT VFR BLD AUTO: 40.8 % (ref 35–47)
HGB BLD-MCNC: 13.1 G/DL (ref 11.7–15.7)
MCH RBC QN AUTO: 27.4 PG (ref 26.5–33)
MCHC RBC AUTO-ENTMCNC: 32.1 G/DL (ref 31.5–36.5)
MCV RBC AUTO: 85 FL (ref 78–100)
PLATELET # BLD AUTO: 304 10E9/L (ref 150–450)
RBC # BLD AUTO: 4.78 10E12/L (ref 3.8–5.2)
WBC # BLD AUTO: 7.3 10E9/L (ref 4–11)

## 2018-03-22 PROCEDURE — 82550 ASSAY OF CK (CPK): CPT | Performed by: FAMILY MEDICINE

## 2018-03-22 PROCEDURE — 84443 ASSAY THYROID STIM HORMONE: CPT | Performed by: FAMILY MEDICINE

## 2018-03-22 PROCEDURE — 36415 COLL VENOUS BLD VENIPUNCTURE: CPT | Performed by: FAMILY MEDICINE

## 2018-03-22 PROCEDURE — 99214 OFFICE O/P EST MOD 30 MIN: CPT | Performed by: FAMILY MEDICINE

## 2018-03-22 PROCEDURE — 85027 COMPLETE CBC AUTOMATED: CPT | Performed by: FAMILY MEDICINE

## 2018-03-22 PROCEDURE — 71046 X-RAY EXAM CHEST 2 VIEWS: CPT | Mod: FY

## 2018-03-22 PROCEDURE — 80053 COMPREHEN METABOLIC PANEL: CPT | Performed by: FAMILY MEDICINE

## 2018-03-22 PROCEDURE — 93000 ELECTROCARDIOGRAM COMPLETE: CPT | Performed by: FAMILY MEDICINE

## 2018-03-22 NOTE — PATIENT INSTRUCTIONS
Please take over the counter pain medications as needed   Motrin/ibuprofen 600 mg four times daily as needed , always with - meals, for next 3 days    Pay attention if pain unresolved follow up next week  Chest xray  Is clear  Tenderness over the chest/ rib cage- seems muscle strain  Blood test for thyroid anemia, CMP

## 2018-03-22 NOTE — TELEPHONE ENCOUNTER
achiness on Left side rib cage and it occasionally radiates up to armpit. Has noticed SOB at rest and with activity. On occasion feels numbness and tingling in left arm/hand. Some nausea on occasion. Family hx of CHF. Huddled with AS, ok to come in now and have her roomed right away.  Sera Chávez RN

## 2018-03-22 NOTE — NURSING NOTE
"Chief Complaint   Patient presents with     Chest Pain     x 1 week      Initial /89  Pulse 102  Temp 98.9  F (37.2  C) (Oral)  Resp 16  Wt 255 lb 3.2 oz (115.8 kg)  LMP 03/18/2018  SpO2 100%  Breastfeeding? No  BMI 41.82 kg/m2 Estimated body mass index is 41.82 kg/(m^2) as calculated from the following:    Height as of 9/12/17: 5' 5.5\" (1.664 m).    Weight as of this encounter: 255 lb 3.2 oz (115.8 kg).  BP completed using cuff size: large. L  arm       Health Maintenance that is potentially due pending provider review:   NONE      Malinda Calle CMA     "

## 2018-03-22 NOTE — LETTER
March 26, 2018      Maribel Osuna  [address]  [facility] [address]        Dear ,    We are writing to inform you of your test results.    -Liver and gallbladder tests are normal. (ALT,AST, Alk phos, bilirubin), kidney function is normal (Cr, GFR), Sodium is normal, Potassium is normal, Calcium is normal, Glucose is normal (diabetes screening test). CK- muscle enzyme good.  -TSH (thyroid stimulating hormone) level is normal which indicates normal thyroid function.  -Normal red blood cell (hgb) levels, normal white blood cell count and normal platelet levels.  - Chest xray was normal as well.    Resulted Orders   Comprehensive metabolic panel   Result Value Ref Range    Sodium 137 133 - 144 mmol/L    Potassium 4.1 3.4 - 5.3 mmol/L    Chloride 104 94 - 109 mmol/L    Carbon Dioxide 28 20 - 32 mmol/L    Anion Gap 5 3 - 14 mmol/L    Glucose 82 70 - 99 mg/dL    Urea Nitrogen 8 7 - 30 mg/dL    Creatinine 0.59 0.52 - 1.04 mg/dL    GFR Estimate >90 >60 mL/min/1.7m2      Comment:      Non  GFR Calc    GFR Estimate If Black >90 >60 mL/min/1.7m2      Comment:       GFR Calc    Calcium 9.5 8.5 - 10.1 mg/dL    Bilirubin Total 0.2 0.2 - 1.3 mg/dL    Albumin 3.9 3.4 - 5.0 g/dL    Protein Total 8.2 6.8 - 8.8 g/dL    Alkaline Phosphatase 62 40 - 150 U/L    ALT 14 0 - 50 U/L    AST 16 0 - 45 U/L   TSH with free T4 reflex   Result Value Ref Range    TSH 1.11 0.40 - 4.00 mU/L   CK total   Result Value Ref Range    CK Total 50 30 - 225 U/L   CBC with platelets   Result Value Ref Range    WBC 7.3 4.0 - 11.0 10e9/L    RBC Count 4.78 3.8 - 5.2 10e12/L    Hemoglobin 13.1 11.7 - 15.7 g/dL    Hematocrit 40.8 35.0 - 47.0 %    MCV 85 78 - 100 fl    MCH 27.4 26.5 - 33.0 pg    MCHC 32.1 31.5 - 36.5 g/dL    RDW 13.6 10.0 - 15.0 %    Platelet Count 304 150 - 450 10e9/L       If you have any questions or concerns, please call the clinic at the number listed above.       Sincerely,        Ida  Inga Murguia MD
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
## 2018-03-22 NOTE — MR AVS SNAPSHOT
After Visit Summary   3/22/2018    Maribel Osuna    MRN: [id number]           Patient Information     Date Of Birth          1980        Visit Information        Provider Department      3/22/2018 3:00 PM Ida Murguia MD [facility]        Today's Diagnoses     Rib pain on left side    -  1    Anxiety        Abnormal electrocardiogram          Care Instructions    Please take over the counter pain medications as needed   Motrin/ibuprofen 600 mg four times daily as needed , always with - meals, for next 3 days    Pay attention if pain unresolved follow up next week  Chest xray  Is clear  Tenderness over the chest/ rib cage- seems muscle strain  Blood test for thyroid anemia, CMP          Follow-ups after your visit        Additional Services     CARDIO  ADULT REFERRAL       [facility] is referring you to Cardiology Services.       The  Representative will assist you in the coordination of your Cardiology care as prescribed by your physician.    The  Representative will call you within 24 hours to help schedule your appointment, or you may contact the  Representative at: [phone].         Type of Referral: New Cardiology Referral            Timeframe requested: 1-2 days       Coverage of these services is subject to the terms and limitations of your health insurance plan.  Please call member services at your health plan with any benefit or coverage questions.      If X-rays, CT or MRI's have been performed, please contact the facility where they were done to arrange for , prior to your scheduled appointment.  Please bring this referral request to your appointment and present it to your specialist.                  Your next 10 appointments already scheduled     Apr 17, 2018 10:40 AM CDT   (Arrive by 10:25 AM)   [facility] Extremity with Ethel Ladd PT   [facility] ([facility]  "[facility]  ) [address]  [place] [address] [phone]              Who to contact     If you have questions or need follow up information about today's clinic visit or your schedule please contact [facility] directly at [phone].  Normal or non-critical lab and imaging results will be communicated to you by MyChart, letter or phone within 4 business days after the clinic has received the results. If you do not hear from us within 7 days, please contact the clinic through MyChart or phone. If you have a critical or abnormal lab result, we will notify you by phone as soon as possible.  Submit refill requests through [employer] or call your pharmacy and they will forward the refill request to us. Please allow 3 business days for your refill to be completed.          Additional Information About Your Visit        MyChart Information     [employer] lets you send messages to your doctor, view your test results, renew your prescriptions, schedule appointments and more. To sign up, go to [email or web address] . Click on \"Log in\" on the left side of the screen, which will take you to the Welcome page. Then click on \"Sign up Now\" on the right side of the page.     You will be asked to enter the access code listed below, as well as some personal information. Please follow the directions to create your username and password.     Your access code is: [email or web address]  Expires: 2018  3:54 PM     Your access code will  in 90 days. If you need help or a new code, please call your [facility] clinic or [phone].        Care EveryWhere ID     This is your Care EveryWhere ID. This could be used by other organizations to access your [facility] medical records  [id number]        Your Vitals Were     Pulse Temperature Respirations Last Period Pulse Oximetry Breastfeeding?    102 98.9  F (37.2  C) (Oral) 16 2018 100% No    BMI (Body Mass Index)                   41.82 kg/m2            " Blood Pressure from Last 3 Encounters:   03/22/18 132/89   09/12/17 110/68   08/24/17 104/78    Weight from Last 3 Encounters:   03/22/18 255 lb 3.2 oz (115.8 kg)   09/12/17 222 lb (100.7 kg)   08/24/17 222 lb (100.7 kg)              We Performed the Following     CARDIO  ADULT REFERRAL     CBC with platelets     CK total     Comprehensive metabolic panel     EKG 12-lead complete w/read - Clinics     TSH with free T4 reflex     XR Chest 2 Views        Primary Care Provider Office Phone # Fax #    Ida Inga Murguia [phone] [address] [facility] [address]        Equal Access to Services     MIKI VALDEZ : Victorina wintero Tino, wademida reyna, qaybta kaalmada nisha, sophia salazar . So [facility] [phone].    ATENCIÓN: Si habla español, tiene a quinonez disposición servicios gratuitos de asistencia lingüística. Llame al [phone].    We comply with applicable federal civil rights laws and [place] laws. We do not discriminate on the basis of race, color, national origin, age, disability, sex, sexual orientation, or gender identity.            Thank you!     Thank you for choosing [facility]  for your care. Our goal is always to provide you with excellent care. Hearing back from our patients is one way we can continue to improve our services. Please take a few minutes to complete the written survey that you may receive in the mail after your visit with us. Thank you!             Your Updated Medication List - Protect others around you: Learn how to safely use, store and throw away your medicines at www.disposemymeds.org.          This list is accurate as of 3/22/18  4:00 PM.  Always use your most recent med list.                   Brand Name Dispense Instructions for use Diagnosis    ARTIFICIAL TEARS 83-15 % Oint           B-12 1000 MCG Tbcr     100 tablet    Take 100 mg by mouth daily        * ciclopirox 0.77 % Gel     45 g     Apply to involved nails daily x 12 months.  Weekly, remove old medication and file nail.    Onychomycosis       * ciclopirox 8 % Soln     6.6 mL    Apply daily to involved nails x 1 year.  Once weekly, removed old medication and roughen nail with file.    Onychomycosis       fluticasone 50 MCG/ACT spray    FLONASE    16 mL    SHAKE LIQUID AND USE 1 TO 2 SPRAYS IN EACH NOSTRIL DAILY AS NEEDED FOR RHINITIS OR ALLERGIES    Chronic rhinitis       loratadine 10 MG tablet    CLARITIN    30 tablet    TAKE 1 TABLET(10 MG) BY MOUTH DAILY    Chronic rhinitis       MELATONIN PO      Take 2.5 mg by mouth At Bedtime        Multi-vitamin Tabs tablet     100 tablet    Take 1 tablet by mouth 3 times daily        * Notice:  This list has 2 medication(s) that are the same as other medications prescribed for you. Read the directions carefully, and ask your doctor or other care provider to review them with you.

## 2018-03-23 LAB
ALBUMIN SERPL-MCNC: 3.9 G/DL (ref 3.4–5)
ALP SERPL-CCNC: 62 U/L (ref 40–150)
ALT SERPL W P-5'-P-CCNC: 14 U/L (ref 0–50)
ANION GAP SERPL CALCULATED.3IONS-SCNC: 5 MMOL/L (ref 3–14)
AST SERPL W P-5'-P-CCNC: 16 U/L (ref 0–45)
BILIRUB SERPL-MCNC: 0.2 MG/DL (ref 0.2–1.3)
BUN SERPL-MCNC: 8 MG/DL (ref 7–30)
CALCIUM SERPL-MCNC: 9.5 MG/DL (ref 8.5–10.1)
CHLORIDE SERPL-SCNC: 104 MMOL/L (ref 94–109)
CK SERPL-CCNC: 50 U/L (ref 30–225)
CO2 SERPL-SCNC: 28 MMOL/L (ref 20–32)
CREAT SERPL-MCNC: 0.59 MG/DL (ref 0.52–1.04)
GFR SERPL CREATININE-BSD FRML MDRD: >90 ML/MIN/1.7M2
GLUCOSE SERPL-MCNC: 82 MG/DL (ref 70–99)
POTASSIUM SERPL-SCNC: 4.1 MMOL/L (ref 3.4–5.3)
PROT SERPL-MCNC: 8.2 G/DL (ref 6.8–8.8)
SODIUM SERPL-SCNC: 137 MMOL/L (ref 133–144)
TSH SERPL DL<=0.005 MIU/L-ACNC: 1.11 MU/L (ref 0.4–4)

## 2018-03-26 ENCOUNTER — PRE VISIT (OUTPATIENT)
Dept: CARDIOLOGY | Facility: CLINIC | Age: 38
End: 2018-03-26

## 2018-03-26 NOTE — PROGRESS NOTES
Send lab & letter alngwith Chest xray  Report- completed same date    -Liver and gallbladder tests are normal. (ALT,AST, Alk phos, bilirubin), kidney function is normal (Cr, GFR), Sodium is normal, Potassium is normal, Calcium is normal, Glucose is normal (diabetes screening test).  CK- muscle enzyme good.  -TSH (thyroid stimulating hormone) level is normal which indicates normal thyroid function.  -Normal red blood cell (hgb) levels, normal white blood cell count and normal platelet levels.  - Chest xray  Was normal as well    Please keep us posted with questions or concerns .      Best Regards,    Ida Murguia MD  [facility] [place] [facility]  [phone]

## 2018-03-27 ENCOUNTER — OFFICE VISIT (OUTPATIENT)
Dept: CARDIOLOGY | Facility: CLINIC | Age: 38
End: 2018-03-27
Attending: FAMILY MEDICINE
Payer: COMMERCIAL

## 2018-03-27 VITALS
WEIGHT: 256.2 LBS | BODY MASS INDEX: 41.17 KG/M2 | HEART RATE: 78 BPM | DIASTOLIC BLOOD PRESSURE: 82 MMHG | SYSTOLIC BLOOD PRESSURE: 110 MMHG | HEIGHT: 66 IN

## 2018-03-27 DIAGNOSIS — E66.01 MORBID OBESITY (H): ICD-10-CM

## 2018-03-27 DIAGNOSIS — R94.31 ABNORMAL EKG: Primary | ICD-10-CM

## 2018-03-27 DIAGNOSIS — R07.89 CHEST DISCOMFORT: ICD-10-CM

## 2018-03-27 PROCEDURE — 99204 OFFICE O/P NEW MOD 45 MIN: CPT | Performed by: INTERNAL MEDICINE

## 2018-03-27 RX ORDER — GUAIFENESIN 600 MG/1
1200 TABLET, EXTENDED RELEASE ORAL 2 TIMES DAILY
COMMUNITY
End: 2019-01-31

## 2018-03-27 NOTE — LETTER
3/27/2018    Ida Murguia MD  [address] [facility] [address]    RE: Maribel Osuna       Dear Colleague,    I had the pleasure of seeing Maribel Osuna in the [facility].    HPI and Plan:   See dictation    Orders Placed This Encounter   Procedures     Lipid Profile     Exercise Stress Echocardiogram       Orders Placed This Encounter   Medications     DiphenhydrAMINE HCl (BENADRYL PO)     Sig: Take 25 mg by mouth daily as needed     St Johns Wort 450 MG CAPS     Sig: Take 1 tablet by mouth daily     Ascorbic Acid (VITAMIN C PO)     Sig: Take 500 mg by mouth daily     guaiFENesin (MUCINEX) 600 MG 12 hr tablet     Sig: Take 1,200 mg by mouth 2 times daily       Medications Discontinued During This Encounter   Medication Reason     ciclopirox 0.77 % GEL      loratadine (CLARITIN) 10 MG tablet          Encounter Diagnoses   Name Primary?     Abnormal EKG Yes     Chest discomfort      Morbid obesity (H)        CURRENT MEDICATIONS:  Current Outpatient Prescriptions   Medication Sig Dispense Refill     DiphenhydrAMINE HCl (BENADRYL PO) Take 25 mg by mouth daily as needed       St Johns Wort 450 MG CAPS Take 1 tablet by mouth daily       Ascorbic Acid (VITAMIN C PO) Take 500 mg by mouth daily       guaiFENesin (MUCINEX) 600 MG 12 hr tablet Take 1,200 mg by mouth 2 times daily       fluticasone (FLONASE) 50 MCG/ACT spray SHAKE LIQUID AND USE 1 TO 2 SPRAYS IN EACH NOSTRIL DAILY AS NEEDED FOR RHINITIS OR ALLERGIES 16 mL 0     ciclopirox 8 % SOLN Apply daily to involved nails x 1 year.  Once weekly, removed old medication and roughen nail with file. 6.6 mL 2     White Petrolatum-Mineral Oil (ARTIFICIAL TEARS) 83-15 % OINT   6     MELATONIN PO Take 2.5 mg by mouth At Bedtime        multivitamin, therapeutic with minerals (MULTI-VITAMIN) TABS Take 1 tablet by mouth 3 times daily  100 tablet 3     Cyanocobalamin (B-12) 1000 MCG TBCR Take 100 mg by mouth daily 100 tablet 1        ALLERGIES     Allergies   Allergen Reactions     Codeine Anaphylaxis     Cats      Dust Mites      Prednisone Nausea       PAST MEDICAL HISTORY:  Past Medical History:   Diagnosis Date     Abnormal EKG      Anxiety 3/6/2017     Common wart 9/17/2014     Fertility testing 7/11/2014     Left knee pain 7/11/2014     Lipoma of skin and subcutaneous tissue 7/9/2014       PAST SURGICAL HISTORY:  Past Surgical History:   Procedure Laterality Date     EXCISE LESION TRUNK N/A 9/12/2014    Procedure: EXCISE LESION TRUNK;  Surgeon: Denver Cooper MD;  Location: [facility]       FAMILY HISTORY:  Family History   Problem Relation Age of Onset     Hypertension Mother      Allergies Mother      Arthritis Mother      rheumatoid     Depression Mother      Fa, mgf, mgm, pgm, pgf sis, bro all family     Lipids Mother      Psychotic Disorder Mother      all family     Hypertension Father      Neurologic Disorder Father      Glaucoma Father      Vision Loss Father      C.A.D. Maternal Grandmother      HEART DISEASE Maternal Grandmother      Alcohol/Drug Maternal Grandmother      Gynecology Maternal Grandmother      Lipids Maternal Grandmother      Obesity Maternal Grandmother      Cancer - colorectal Paternal Grandmother      Alcohol/Drug Paternal Grandmother      Alcohol/Drug Maternal Grandfather      Alcohol/Drug Paternal Grandfather      Allergies Sister      Allergies Brother      DIABETES No family hx of      Coronary Artery Disease No family hx of      Hyperlipidemia No family hx of      CEREBROVASCULAR DISEASE No family hx of      Breast Cancer No family hx of      Colon Cancer No family hx of      Prostate Cancer No family hx of      Other Cancer No family hx of      Anxiety Disorder No family hx of      MENTAL ILLNESS No family hx of      Substance Abuse No family hx of      Anesthesia Reaction No family hx of      Asthma No family hx of      OSTEOPOROSIS No family hx of      Genetic Disorder No family hx of      Thyroid  "Disease No family hx of      Unknown/Adopted No family hx of      Macular Degeneration No family hx of        SOCIAL HISTORY:  Social History     Social History     Marital status:      Spouse name: N/A     Number of children: 0     Years of education: N/A     Social History Main Topics     Smoking status: Never Smoker     Smokeless tobacco: Never Used     Alcohol use 0.0 oz/week     0 Standard drinks or equivalent per week      Comment: 1 per month     Drug use: No     Sexual activity: Yes     Partners: Male     Other Topics Concern     Parent/Sibling W/ Cabg, Mi Or Angioplasty Before 65f 55m? No     unknown for sure     Social History Narrative       Review of Systems:  Skin:  Negative       Eyes:  Positive for glasses    ENT:  Negative      Respiratory:  Positive for shortness of breath     Cardiovascular:    Positive for;chest pain;dizziness    Gastroenterology: Negative      Genitourinary:  Negative      Musculoskeletal:  Positive for joint pain (pt has joint pain on left side when sleeping on it)    Neurologic:  Positive for numbness or tingling of hands (left hand only, minor pain down left arm only)    Psychiatric:  Positive for excessive stress;anxiety;sleep disturbances    Heme/Lymph/Imm:  Negative      Endocrine:  Positive for cold intolerance (finger tips)      Physical Exam:  Vitals: /82  Pulse 78  Ht 1.664 m (5' 5.5\")  Wt 116.2 kg (256 lb 3.2 oz)  LMP 03/18/2018  BMI 41.99 kg/m2    Constitutional:  cooperative, alert and oriented, well developed, well nourished, in no acute distress morbidly obese      Skin:  warm and dry to the touch, no apparent skin lesions or masses noted          Head:  normocephalic, no masses or lesions        Eyes:  pupils equal and round, conjunctivae and lids unremarkable, sclera white, no xanthalasma, EOMS intact, no nystagmus        Lymph:      ENT:  no pallor or cyanosis, dentition good        Neck:  carotid pulses are full and equal bilaterally;no " carotid bruit        Respiratory:            Cardiac: regular rhythm;normal S1 and S2;no murmurs, gallops or rubs detected                pulses full and equal                                        GI:    obese      Extremities and Muscular Skeletal:  no edema;no spinal abnormalities noted;normal muscle strength and tone              Neurological:  no gross motor deficits        Psych:  affect appropriate, oriented to time, person and place        CC  Ida Murguia MD  [address] [place] [address]                Thank you for allowing me to participate in the care of your patient.      Sincerely,     Fortunato Lees MD     [facility]    cc:   Ida Murguia MD  [address] [place] [address]

## 2018-03-27 NOTE — PROGRESS NOTES
HPI and Plan:   See dictation    Orders Placed This Encounter   Procedures     Lipid Profile     Exercise Stress Echocardiogram       Orders Placed This Encounter   Medications     DiphenhydrAMINE HCl (BENADRYL PO)     Sig: Take 25 mg by mouth daily as needed     St Johns Wort 450 MG CAPS     Sig: Take 1 tablet by mouth daily     Ascorbic Acid (VITAMIN C PO)     Sig: Take 500 mg by mouth daily     guaiFENesin (MUCINEX) 600 MG 12 hr tablet     Sig: Take 1,200 mg by mouth 2 times daily       Medications Discontinued During This Encounter   Medication Reason     ciclopirox 0.77 % GEL      loratadine (CLARITIN) 10 MG tablet          Encounter Diagnoses   Name Primary?     Abnormal EKG Yes     Chest discomfort      Morbid obesity (H)        CURRENT MEDICATIONS:  Current Outpatient Prescriptions   Medication Sig Dispense Refill     DiphenhydrAMINE HCl (BENADRYL PO) Take 25 mg by mouth daily as needed       St Johns Wort 450 MG CAPS Take 1 tablet by mouth daily       Ascorbic Acid (VITAMIN C PO) Take 500 mg by mouth daily       guaiFENesin (MUCINEX) 600 MG 12 hr tablet Take 1,200 mg by mouth 2 times daily       fluticasone (FLONASE) 50 MCG/ACT spray SHAKE LIQUID AND USE 1 TO 2 SPRAYS IN EACH NOSTRIL DAILY AS NEEDED FOR RHINITIS OR ALLERGIES 16 mL 0     ciclopirox 8 % SOLN Apply daily to involved nails x 1 year.  Once weekly, removed old medication and roughen nail with file. 6.6 mL 2     White Petrolatum-Mineral Oil (ARTIFICIAL TEARS) 83-15 % OINT   6     MELATONIN PO Take 2.5 mg by mouth At Bedtime        multivitamin, therapeutic with minerals (MULTI-VITAMIN) TABS Take 1 tablet by mouth 3 times daily  100 tablet 3     Cyanocobalamin (B-12) 1000 MCG TBCR Take 100 mg by mouth daily 100 tablet 1       ALLERGIES     Allergies   Allergen Reactions     Codeine Anaphylaxis     Cats      Dust Mites      Prednisone Nausea       PAST MEDICAL HISTORY:  Past Medical History:   Diagnosis Date     Abnormal EKG      Anxiety 3/6/2017      Common wart 9/17/2014     Fertility testing 7/11/2014     Left knee pain 7/11/2014     Lipoma of skin and subcutaneous tissue 7/9/2014       PAST SURGICAL HISTORY:  Past Surgical History:   Procedure Laterality Date     EXCISE LESION TRUNK N/A 9/12/2014    Procedure: EXCISE LESION TRUNK;  Surgeon: Denver Cooper MD;  Location: [facility]       FAMILY HISTORY:  Family History   Problem Relation Age of Onset     Hypertension Mother      Allergies Mother      Arthritis Mother      rheumatoid     Depression Mother      Fa, mgf, mgm, pgm, pgf sis, bro all family     Lipids Mother      Psychotic Disorder Mother      all family     Hypertension Father      Neurologic Disorder Father      Glaucoma Father      Vision Loss Father      C.A.D. Maternal Grandmother      HEART DISEASE Maternal Grandmother      Alcohol/Drug Maternal Grandmother      Gynecology Maternal Grandmother      Lipids Maternal Grandmother      Obesity Maternal Grandmother      Cancer - colorectal Paternal Grandmother      Alcohol/Drug Paternal Grandmother      Alcohol/Drug Maternal Grandfather      Alcohol/Drug Paternal Grandfather      Allergies Sister      Allergies Brother      DIABETES No family hx of      Coronary Artery Disease No family hx of      Hyperlipidemia No family hx of      CEREBROVASCULAR DISEASE No family hx of      Breast Cancer No family hx of      Colon Cancer No family hx of      Prostate Cancer No family hx of      Other Cancer No family hx of      Anxiety Disorder No family hx of      MENTAL ILLNESS No family hx of      Substance Abuse No family hx of      Anesthesia Reaction No family hx of      Asthma No family hx of      OSTEOPOROSIS No family hx of      Genetic Disorder No family hx of      Thyroid Disease No family hx of      Unknown/Adopted No family hx of      Macular Degeneration No family hx of        SOCIAL HISTORY:  Social History     Social History     Marital status:      Spouse name: N/A     Number of children:  "0     Years of education: N/A     Social History Main Topics     Smoking status: Never Smoker     Smokeless tobacco: Never Used     Alcohol use 0.0 oz/week     0 Standard drinks or equivalent per week      Comment: 1 per month     Drug use: No     Sexual activity: Yes     Partners: Male     Other Topics Concern     Parent/Sibling W/ Cabg, Mi Or Angioplasty Before 65f 55m? No     unknown for sure     Social History Narrative       Review of Systems:  Skin:  Negative       Eyes:  Positive for glasses    ENT:  Negative      Respiratory:  Positive for shortness of breath     Cardiovascular:    Positive for;chest pain;dizziness    Gastroenterology: Negative      Genitourinary:  Negative      Musculoskeletal:  Positive for joint pain (pt has joint pain on left side when sleeping on it)    Neurologic:  Positive for numbness or tingling of hands (left hand only, minor pain down left arm only)    Psychiatric:  Positive for excessive stress;anxiety;sleep disturbances    Heme/Lymph/Imm:  Negative      Endocrine:  Positive for cold intolerance (finger tips)      Physical Exam:  Vitals: /82  Pulse 78  Ht 1.664 m (5' 5.5\")  Wt 116.2 kg (256 lb 3.2 oz)  LMP 03/18/2018  BMI 41.99 kg/m2    Constitutional:  cooperative, alert and oriented, well developed, well nourished, in no acute distress morbidly obese      Skin:  warm and dry to the touch, no apparent skin lesions or masses noted          Head:  normocephalic, no masses or lesions        Eyes:  pupils equal and round, conjunctivae and lids unremarkable, sclera white, no xanthalasma, EOMS intact, no nystagmus        Lymph:      ENT:  no pallor or cyanosis, dentition good        Neck:  carotid pulses are full and equal bilaterally;no carotid bruit        Respiratory:            Cardiac: regular rhythm;normal S1 and S2;no murmurs, gallops or rubs detected                pulses full and equal                                        GI:    obese      Extremities and " Muscular Skeletal:  no edema;no spinal abnormalities noted;normal muscle strength and tone              Neurological:  no gross motor deficits        Psych:  affect appropriate, oriented to time, person and place        CC  Ida Murguia MD  [address] [place] [address]
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
## 2018-03-27 NOTE — MR AVS SNAPSHOT
After Visit Summary   3/27/2018    Maribel Osuna    MRN: [id number]           Patient Information     Date Of Birth          1980        Visit Information        Provider Department      3/27/2018 11:15 AM Fortunato Lees MD [facility]        Today's Diagnoses     Abnormal EKG    -  1    Chest discomfort           Follow-ups after your visit        Your next 10 appointments already scheduled     Apr 17, 2018 10:40 AM CDT   (Arrive by 10:25 AM)   YANDEL Extremity with Ethel Ladd PT   [facility] [place] [facility] ([facility]    [address]  [place] [address] [phone]              Future tests that were ordered for you today     Open Future Orders        Priority Expected Expires Ordered    Exercise Stress Echocardiogram Routine 4/3/2018 3/27/2019 3/27/2018    Lipid Profile Routine 4/3/2018 3/27/2019 3/27/2018            Who to contact     If you have questions or need follow up information about today's clinic visit or your schedule please contact [facility] directly at [phone].  Normal or non-critical lab and imaging results will be communicated to you by MyChart, letter or phone within 4 business days after the clinic has received the results. If you do not hear from us within 7 days, please contact the clinic through Artimihart or phone. If you have a critical or abnormal lab result, we will notify you by phone as soon as possible.  Submit refill requests through [email or web address] or call your pharmacy and they will forward the refill request to us. Please allow 3 business days for your refill to be completed.          Additional Information About Your Visit        MyChart Information     [email or web address] lets you send messages to your doctor, view your test results, renew your prescriptions, schedule appointments and more. To sign up, go to [email or web address] .  "Click on \"Log in\" on the left side of the screen, which will take you to the Welcome page. Then click on \"Sign up Now\" on the right side of the page.     You will be asked to enter the access code listed below, as well as some personal information. Please follow the directions to create your username and password.     Your access code is: [email or web address]  Expires: 2018  3:54 PM     Your access code will  in 90 days. If you need help or a new code, please call your [place] clinic or [phone].        Care EveryWhere ID     This is your Care EveryWhere ID. This could be used by other organizations to access your [place] medical records  [id number]        Your Vitals Were     Pulse Height Last Period BMI (Body Mass Index)          78 1.664 m (5' 5.5\") 2018 41.99 kg/m2         Blood Pressure from Last 3 Encounters:   18 110/82   18 132/89   17 110/68    Weight from Last 3 Encounters:   18 116.2 kg (256 lb 3.2 oz)   18 115.8 kg (255 lb 3.2 oz)   17 100.7 kg (222 lb)                 Today's Medication Changes          These changes are accurate as of 3/27/18 12:09 PM.  If you have any questions, ask your nurse or doctor.               These medicines have changed or have updated prescriptions.        Dose/Directions    ciclopirox 8 % Soln   This may have changed:  Another medication with the same name was removed. Continue taking this medication, and follow the directions you see here.   Used for:  Onychomycosis   Changed by:  Fortunato Lees MD        Apply daily to involved nails x 1 year.  Once weekly, removed old medication and roughen nail with file.   Quantity:  6.6 mL   Refills:  2         Stop taking these medicines if you haven't already. Please contact your care team if you have questions.     loratadine 10 MG tablet   Commonly known as:  CLARITIN   Stopped by:  Fortunato Lees MD                    Primary Care Provider Office Phone # Fax #    " Ida Murguia [phone]       [address] [address]        Equal Access to Services     MIKI VALDEZ : Hadbashir kyra martínez nirmal Musayola, saray tiaraaliciaha, shefali karaymond cameron, sophia douglas alyssajudy garcia lajanessacecile emmanuel. So [facility] [phone].    ATENCIÓN: Si habla español, tiene a quinonez disposición servicios gratuitos de asistencia lingüística. Llame al [phone].    We comply with applicable federal civil rights laws and [place] laws. We do not discriminate on the basis of race, color, national origin, age, disability, sex, sexual orientation, or gender identity.            Thank you!     Thank you for choosing [facility]  for your care. Our goal is always to provide you with excellent care. Hearing back from our patients is one way we can continue to improve our services. Please take a few minutes to complete the written survey that you may receive in the mail after your visit with us. Thank you!             Your Updated Medication List - Protect others around you: Learn how to safely use, store and throw away your medicines at www.disposemymeds.org.          This list is accurate as of 3/27/18 12:09 PM.  Always use your most recent med list.                   Brand Name Dispense Instructions for use Diagnosis    ARTIFICIAL TEARS 83-15 % Oint           B-12 1000 MCG Tbcr     100 tablet    Take 100 mg by mouth daily        BENADRYL PO      Take 25 mg by mouth daily as needed        ciclopirox 8 % Soln     6.6 mL    Apply daily to involved nails x 1 year.  Once weekly, removed old medication and roughen nail with file.    Onychomycosis       fluticasone 50 MCG/ACT spray    FLONASE    16 mL    SHAKE LIQUID AND USE 1 TO 2 SPRAYS IN EACH NOSTRIL DAILY AS NEEDED FOR RHINITIS OR ALLERGIES    Chronic rhinitis       guaiFENesin 600 MG 12 hr tablet    MUCINEX     Take 1,200 mg by mouth 2 times daily        MELATONIN PO      Take 2.5 mg by  mouth At Bedtime        Multi-vitamin Tabs tablet     100 tablet    Take 1 tablet by mouth 3 times daily        St Johns Wort 450 MG Caps      Take 1 tablet by mouth daily        VITAMIN C PO      Take 500 mg by mouth daily

## 2018-04-02 ENCOUNTER — TELEPHONE (OUTPATIENT)
Dept: CARDIOLOGY | Facility: CLINIC | Age: 38
End: 2018-04-02

## 2018-04-02 ENCOUNTER — HOSPITAL ENCOUNTER (OUTPATIENT)
Dept: CARDIOLOGY | Facility: CLINIC | Age: 38
Discharge: HOME | End: 2018-04-02
Attending: INTERNAL MEDICINE | Admitting: INTERNAL MEDICINE
Payer: COMMERCIAL

## 2018-04-02 DIAGNOSIS — R07.89 CHEST DISCOMFORT: ICD-10-CM

## 2018-04-02 LAB
CHOLEST SERPL-MCNC: 155 MG/DL
HDLC SERPL-MCNC: 55 MG/DL
LDLC SERPL CALC-MCNC: 82 MG/DL
NONHDLC SERPL-MCNC: 100 MG/DL
TRIGL SERPL-MCNC: 88 MG/DL

## 2018-04-02 PROCEDURE — 80061 LIPID PANEL: CPT | Performed by: INTERNAL MEDICINE

## 2018-04-02 PROCEDURE — 36415 COLL VENOUS BLD VENIPUNCTURE: CPT | Performed by: INTERNAL MEDICINE

## 2018-04-02 PROCEDURE — 93018 CV STRESS TEST I&R ONLY: CPT | Performed by: INTERNAL MEDICINE

## 2018-04-02 PROCEDURE — 93325 DOPPLER ECHO COLOR FLOW MAPG: CPT | Mod: 26 | Performed by: INTERNAL MEDICINE

## 2018-04-02 PROCEDURE — 93350 STRESS TTE ONLY: CPT | Mod: 26 | Performed by: INTERNAL MEDICINE

## 2018-04-02 PROCEDURE — 93017 CV STRESS TEST TRACING ONLY: CPT

## 2018-04-02 PROCEDURE — 93321 DOPPLER ECHO F-UP/LMTD STD: CPT | Mod: 26 | Performed by: INTERNAL MEDICINE

## 2018-04-02 PROCEDURE — 93016 CV STRESS TEST SUPVJ ONLY: CPT | Performed by: INTERNAL MEDICINE

## 2018-04-02 NOTE — LETTER
April 9, 2018       TO: Maribel Osuna  [address]  [place] [address]       Dear Maribel Osuna,      Your recent stress echo and labs were reviewed by Dr. Lees, his recommendations are:   Fasting lipid profile is excellent continue your  healthy lifestyle.  Stress echocardiogram also appears to be normal.  I do not think her chest pain has anything to do with your  heart.  Continue to lead a  healthy lifestyle.  You can follow back up with me anytime if needed but do not  need routine follow-up.           Stress echocardiogram 4-2-18  - was a normal stress EKG and a normal stress echocardiogram with no evidence of stress-induced ischemia.         Cholesterol labs -   Chol     HDL     LDL     TG   155      55      82    88       Please call with any questions to Our Nurse Team 2. Phone # [phone]    Thank you,    [facility]

## 2018-04-02 NOTE — LETTER
April 9, 2018       TO: Maribel Osuna  [address]  [place] [address]       Dear Maribel Osuna,    Your recent Stress echo and lab results were reviewed by Dr. Lees, his recommendations are:  Fasting lipid profile is excellent continue your  healthy lifestyle.  Stress echocardiogram also appears to be normal.  I do not think her chest pain has anything to do with her heart.  Continue to lead a  healthy lifestyle   You  can follow back up with me anytime if needed but do  not need routine follow-up.      Stress echocardiogram 4-2-18 - normal stress EKG and a normal stress echocardiogram with no evidence of stress-induced ischemia.    Cholesterol labs -   Chol HDL LDL rohit Chol/HDL TG    04/02/18 1333 155 55 82 -- 88       Please call with any questions to Nurse Team 2. Phone # [phone]    Thank you,    [facility]

## 2018-04-02 NOTE — TELEPHONE ENCOUNTER
Stress echo 4-2-18 - Last seen by Dr. Lees 3-27-18.   Exercise stopped due to fatigue and dizziness. The patient noted chest  discomfort before, during and after exercise. It reportedly did not change.  Otherwise with the atypical chest pain noted, the EKG portion of this stress  test was negative for inducible ischemia (see echo results below) as there was  no new ST segment depression.  The visual ejection fraction is estimated at >70% with normal resting wall  motion and no stress-induced wall motion abnormalities.  This was a normal stress EKG and a normal stress echocardiogram with no  evidence of stress-induced ischemia.  The results of the stress echocardiogram were conveyed to the patient today.  FLP's done 4-2-18  Lipids    Chol HDL LDL rohit Chol/HDL TG   04/02/18 1333 155 55 82 -- 88     Dr. Lees to review.

## 2018-04-08 NOTE — TELEPHONE ENCOUNTER
Fasting lipid profile is excellent continue her healthy lifestyle.  Stress echocardiogram also appears to be normal.  I do not think her chest pain has anything to do with her heart.  Congratulated her on her healthy lifestyle and encouraged her to continue.  She can follow back up with me anytime she feels she needs it.  She does not need routine follow-up.

## 2018-04-09 NOTE — TELEPHONE ENCOUNTER
Unable to contact patient. Phone numbers are not correct, and disconnected. Results letter mailed.

## 2018-04-20 PROBLEM — M25.551 RIGHT HIP PAIN: Status: RESOLVED | Noted: 2017-09-08 | Resolved: 2018-04-20

## 2018-04-20 PROBLEM — M25.561 RIGHT KNEE PAIN: Status: RESOLVED | Noted: 2017-09-08 | Resolved: 2018-04-20

## 2018-05-08 ENCOUNTER — THERAPY VISIT (OUTPATIENT)
Dept: PHYSICAL THERAPY | Facility: CLINIC | Age: 38
End: 2018-05-08
Payer: COMMERCIAL

## 2018-05-08 DIAGNOSIS — M25.562 CHRONIC PAIN OF LEFT KNEE: Primary | ICD-10-CM

## 2018-05-08 DIAGNOSIS — G89.29 CHRONIC PAIN OF LEFT KNEE: Primary | ICD-10-CM

## 2018-05-08 PROCEDURE — 97140 MANUAL THERAPY 1/> REGIONS: CPT | Mod: GP | Performed by: PHYSICAL THERAPIST

## 2018-05-08 PROCEDURE — 97110 THERAPEUTIC EXERCISES: CPT | Mod: GP | Performed by: PHYSICAL THERAPIST

## 2018-05-08 PROCEDURE — 97161 PT EVAL LOW COMPLEX 20 MIN: CPT | Mod: GP | Performed by: PHYSICAL THERAPIST

## 2018-05-08 ASSESSMENT — ACTIVITIES OF DAILY LIVING (ADL)
TWISTING/PIVOTING_ON_INVOLVED_LEG: UNABLE TO DO
WALKING_UP_STEEP_HILLS: UNABLE TO DO
GIVING WAY, BUCKLING OR SHIFTING OF KNEE: THE SYMPTOM AFFECTS MY ACTIVITY SLIGHTLY
HOS_ADL_ITEM_SCORE_TOTAL: 23
WALK: ACTIVITY IS VERY DIFFICULT
HOW_WOULD_YOU_RATE_YOUR_CURRENT_LEVEL_OF_FUNCTION_DURING_YOUR_USUAL_ACTIVITIES_OF_DAILY_LIVING_FROM_0_TO_100_WITH_100_BEING_YOUR_LEVEL_OF_FUNCTION_PRIOR_TO_YOUR_HIP_PROBLEM_AND_0_BEING_THE_INABILITY_TO_PERFORM_ANY_OF_YOUR_USUAL_DAILY_ACTIVITIES?: 10
WALKING_INITIALLY: SLIGHT DIFFICULTY
AS_A_RESULT_OF_YOUR_KNEE_INJURY,_HOW_WOULD_YOU_RATE_YOUR_CURRENT_LEVEL_OF_DAILY_ACTIVITY?: SEVERELY ABNORMAL
HOS_ADL_COUNT: 17
KNEEL ON THE FRONT OF YOUR KNEE: ACTIVITY IS SOMEWHAT DIFFICULT
SQUAT: ACTIVITY IS VERY DIFFICULT
LIGHT_TO_MODERATE_WORK: MODERATE DIFFICULTY
SWELLING: THE SYMPTOM AFFECTS MY ACTIVITY SLIGHTLY
GETTING_INTO_AND_OUT_OF_A_BATHTUB: EXTREME DIFFICULTY
PAIN: THE SYMPTOM AFFECTS MY ACTIVITY SEVERELY
KNEE_ACTIVITY_OF_DAILY_LIVING_SCORE: 40
HOW_WOULD_YOU_RATE_THE_CURRENT_FUNCTION_OF_YOUR_KNEE_DURING_YOUR_USUAL_DAILY_ACTIVITIES_ON_A_SCALE_FROM_0_TO_100_WITH_100_BEING_YOUR_LEVEL_OF_KNEE_FUNCTION_PRIOR_TO_YOUR_INJURY_AND_0_BEING_THE_INABILITY_TO_PERFORM_ANY_OF_YOUR_USUAL_DAILY_ACTIVITIES?: 5
STIFFNESS: THE SYMPTOM AFFECTS MY ACTIVITY MODERATELY
GOING_UP_1_FLIGHT_OF_STAIRS: MODERATE DIFFICULTY
GO DOWN STAIRS: ACTIVITY IS VERY DIFFICULT
RAW_SCORE: 28
GO UP STAIRS: ACTIVITY IS VERY DIFFICULT
SITTING_FOR_15_MINUTES: NO DIFFICULTY AT ALL
HEAVY_WORK: UNABLE TO DO
GOING_DOWN_1_FLIGHT_OF_STAIRS: MODERATE DIFFICULTY
PUTTING_ON_SOCKS_AND_SHOES: NO DIFFICULTY AT ALL
HOW_WOULD_YOU_RATE_THE_OVERALL_FUNCTION_OF_YOUR_KNEE_DURING_YOUR_USUAL_DAILY_ACTIVITIES?: SEVERELY ABNORMAL
STEPPING_UP_AND_DOWN_CURBS: SLIGHT DIFFICULTY
GETTING_INTO_AND_OUT_OF_AN_AVERAGE_CAR: SLIGHT DIFFICULTY
WALKING_15_MINUTES_OR_GREATER: UNABLE TO DO
KNEE_ACTIVITY_OF_DAILY_LIVING_SUM: 28
ROLLING_OVER_IN_BED: NO DIFFICULTY AT ALL
LIMPING: THE SYMPTOM AFFECTS MY ACTIVITY SEVERELY
RECREATIONAL_ACTIVITIES: UNABLE TO DO
WEAKNESS: THE SYMPTOM AFFECTS MY ACTIVITY SLIGHTLY
RISE FROM A CHAIR: ACTIVITY IS SOMEWHAT DIFFICULT
WALKING_APPROXIMATELY_10_MINUTES: EXTREME DIFFICULTY
WALKING_DOWN_STEEP_HILLS: UNABLE TO DO
SIT WITH YOUR KNEE BENT: ACTIVITY IS SOMEWHAT DIFFICULT
HOS_ADL_SCORE(%): 33.82
STAND: ACTIVITY IS FAIRLY DIFFICULT
STANDING_FOR_15_MINUTES: MODERATE DIFFICULTY
HOS_ADL_HIGHEST_POTENTIAL_SCORE: 68
DEEP_SQUATTING: UNABLE TO DO

## 2018-05-08 NOTE — PROGRESS NOTES
[place] [facility] Initial Evaluation  Subjective:  Patient is a 37 year old female presenting with rehab left ankle/foot hpi.                                      Pertinent medical history includes:  Overweight, osteoarthritis, depression and mental illness.        Current occupation is [employer].    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        Red flags:  Pain at rest/night and chest pain.           Knee Activity of Daily Living Score: 40            Objective:  System    Physical Exam    General     ROS    Assessment/Plan:

## 2018-05-08 NOTE — MR AVS SNAPSHOT
"              After Visit Summary   2018    Maribel Osuna    MRN: [id number]           Patient Information     Date Of Birth          1980        Visit Information        Provider Department      2018 10:00 AM Ethel Ladd PT [facility]        Today's Diagnoses     Chronic pain of left knee    -  1       Follow-ups after your visit        Who to contact     If you have questions or need follow up information about today's clinic visit or your schedule please contact [facility] directly at [phone].  Normal or non-critical lab and imaging results will be communicated to you by WyzeTalkhart, letter or phone within 4 business days after the clinic has received the results. If you do not hear from us within 7 days, please contact the clinic through WyzeTalkhart or phone. If you have a critical or abnormal lab result, we will notify you by phone as soon as possible.  Submit refill requests through LS[employer] Freedom or call your pharmacy and they will forward the refill request to us. Please allow 3 business days for your refill to be completed.          Additional Information About Your Visit        MyChart Information     LSAT Freedom lets you send messages to your doctor, view your test results, renew your prescriptions, schedule appointments and more. To sign up, go to [email or web address] . Click on \"Log in\" on the left side of the screen, which will take you to the Welcome page. Then click on \"Sign up Now\" on the right side of the page.     You will be asked to enter the access code listed below, as well as some personal information. Please follow the directions to create your username and password.     Your access code is: [email or web address]  Expires: 2018  3:54 PM     Your access code will  in 90 days. If you need help or a new code, please call your [place] clinic or [phone].        Care EveryWhere ID     " This is your Care EveryWhere ID. This could be used by other organizations to access your [place] medical records  [id number]         Blood Pressure from Last 3 Encounters:   03/27/18 110/82   03/22/18 132/89   09/12/17 110/68    Weight from Last 3 Encounters:   03/27/18 116.2 kg (256 lb 3.2 oz)   03/22/18 115.8 kg (255 lb 3.2 oz)   09/12/17 100.7 kg (222 lb)              We Performed the Following     HC PT EVAL, LOW COMPLEXITY     YANDEL INITIAL EVAL REPORT     MANUAL THER TECH,1+REGIONS,EA 15 MIN     THERAPEUTIC EXERCISES        Primary Care Provider Office Phone # Fax #    Ida Murguia [phone] [address] [facility] [address] [facility] [place] [facility] [address]        Equal Access to Services     MIKI VALDEZ : Hadii aad ku hadasho Soomaali, waaxda luqadaha, qaybta kaalmada adeashlie, sophia salazar . So [facility] [phone].    ATENCIÓN: Si habla español, tiene a quinonez disposición servicios gratuitos de asistencia lingüística. [facility] [phone].    We comply with applicable federal civil rights laws and [place] laws. We do not discriminate on the basis of race, color, national origin, age, disability, sex, sexual orientation, or gender identity.            Thank you!     Thank you for choosing [facility]  for your care. Our goal is always to provide you with excellent care. Hearing back from our patients is one way we can continue to improve our services. Please take a few minutes to complete the written survey that you may receive in the mail after your visit with us. Thank you!             Your Updated Medication List - Protect others around you: Learn how to safely use, store and throw away your medicines at www.disposemymeds.org.          This list is accurate as of 5/8/18 12:08 PM.  Always use your most recent med list.                   Brand Name Dispense Instructions for use Diagnosis    ARTIFICIAL TEARS 83-15 % Oint            B-12 1000 MCG Tbcr     100 tablet    Take 100 mg by mouth daily        BENADRYL PO      Take 25 mg by mouth daily as needed        ciclopirox 8 % Soln     6.6 mL    Apply daily to involved nails x 1 year.  Once weekly, removed old medication and roughen nail with file.    Onychomycosis       fluticasone 50 MCG/ACT spray    FLONASE    16 mL    SHAKE LIQUID AND USE 1 TO 2 SPRAYS IN EACH NOSTRIL DAILY AS NEEDED FOR RHINITIS OR ALLERGIES    Chronic rhinitis       guaiFENesin 600 MG 12 hr tablet    MUCINEX     Take 1,200 mg by mouth 2 times daily        MELATONIN PO      Take 2.5 mg by mouth At Bedtime        Multi-vitamin Tabs tablet     100 tablet    Take 1 tablet by mouth 3 times daily        St Johns Wort 450 MG Caps      Take 1 tablet by mouth daily        VITAMIN C PO      Take 500 mg by mouth daily

## 2018-05-08 NOTE — PROGRESS NOTES
"[place] [facility] Initial Evaluation  Subjective:  Patient is a 37 year old female presenting with rehab right knee hpi. The history is provided by the patient. No  was used.   Maribel Osuna is a 37 year old female with a right knee condition.  Condition occurred with:  Other reason.  Condition occurred: other.  This is a chronic condition  Not much has changed with pt since she was seen last time in PT. Has been continually taping R knee to keep progress where it is, but it hasn't gotten better or worse. Has had issues at her gym that caused her to not be able to work out in the last few months. Feels that her leg has atrophied. Feels the most pain in WB when knee is flexed. Has hx of knee \"popping\" which is followed by intense swelling. Pt reports seeing doctor recently but last order was from  On 9-6-17. .    Patient reports pain:  Anterior, medial and in the joint.    Pain is described as aching and is constant and reported as 3/10.  Associated symptoms:  Buckling/giving out, locking and catching.   Symptoms are exacerbated by activity, ascending stairs and weight bearing   Since onset symptoms are unchanged.  Special tests:  MRI (MRI results show lateral menisus tear ).                                                    Objective:  System                                           Hip Evaluation  Hip PROM:  Hip PROM:  Left Hip:    Normal  Right Hip:  Normal                          Hip Strength:    Flexion:   Left: 5/5   Pain:  Right: 4+/5   Pain:                    Extension:  Left: 5/5  Pain:Right: 5/5    Pain:        Internal Rotation:  Left: 5/5    Pain:Right: 5/5   Pain:  External Rotation:  Left: 5/5   Pain:  Right: 5/5   Pain:            Hip Special Testing:      Left hip negative for the following special tests:  Fadir/Labrum   Right hip positive for the following special tests:  Fadir/Labrum           Knee Evaluation:  ROM:      PROM    Hyperextension: Left: 0    " Right:  0  Extension: Left: o    Right:  0  Flexion: Left: 125    Right:  119  Pain: pain at end rom for flexion    Strength:       Flexion:  Left: 5-/5   Pain:      Right: 5-/5   Pain:                        General     ROS    Assessment/Plan:    Patient is a 37 year old female with right side knee complaints.    Patient has the following significant findings with corresponding treatment plan.                Diagnosis 1:  R knee pain  Pain -  hot/cold therapy and manual therapy  Decreased ROM/flexibility - manual therapy and therapeutic exercise  Decreased joint mobility - manual therapy and therapeutic exercise  Impaired muscle performance - neuro re-education  Decreased function - therapeutic activities    Therapy Evaluation Codes:   1) History comprised of:   Personal factors that impact the plan of care:      None.    Comorbidity factors that impact the plan of care are:      None.     Medications impacting care: None.  2) Examination of Body Systems comprised of:   Body structures and functions that impact the plan of care:      Knee.   Activity limitations that impact the plan of care are:      Stairs and Standing.  3) Clinical presentation characteristics are:   Stable/Uncomplicated.  4) Decision-Making    Low complexity using standardized patient assessment instrument and/or measureable assessment of functional outcome.  Cumulative Therapy Evaluation is: Low complexity.    Previous and current functional limitations:  (See Goal Flow Sheet for this information)    Short term and Long term goals: (See Goal Flow Sheet for this information)     Communication ability:  Patient appears to be able to clearly communicate and understand verbal and written communication and follow directions correctly.  Treatment Explanation - The following has been discussed with the patient:   RX ordered/plan of care  Anticipated outcomes  Possible risks and side effects  This patient would benefit from PT intervention to resume  normal activities.   Rehab potential is good.    Frequency:  1 X week, once daily  Duration:  for 6 weeks  Discharge Plan:  Achieve all LTG.  Independent in home treatment program.  Reach maximal therapeutic benefit.    Please refer to the daily flowsheet for treatment today, total treatment time and time spent performing 1:1 timed codes.

## 2018-05-17 DIAGNOSIS — J31.0 CHRONIC RHINITIS: ICD-10-CM

## 2018-05-18 ENCOUNTER — TELEPHONE (OUTPATIENT)
Dept: FAMILY MEDICINE | Facility: CLINIC | Age: 38
End: 2018-05-18

## 2018-05-18 DIAGNOSIS — J31.0 CHRONIC RHINITIS: ICD-10-CM

## 2018-05-18 RX ORDER — FLUTICASONE PROPIONATE 50 MCG
SPRAY, SUSPENSION (ML) NASAL
Qty: 16 ML | Refills: 1 | Status: SHIPPED | OUTPATIENT
Start: 2018-05-18 | End: 2018-11-26

## 2018-05-18 NOTE — TELEPHONE ENCOUNTER
Reason for Call:  Medication or medication refill:    Do you use a [facility]?  Name of the pharmacy and phone number for the current request:  [facility] [id number] [place] [phone] AT [facility] [address]      Name of the medication requested: Flonase    Other request: pt is looking to get 3 refills on this.  She is not in town until August and needs to have refills available to  when she is in town for a day or two over the summer    Can we leave a detailed message on this number? YES    Phone number patient can be reached at: Home number on file [phone] (home)    Best Time: any     Call taken on 5/18/2018 at 10:35 AM by Yanely Rosa

## 2018-05-18 NOTE — TELEPHONE ENCOUNTER
"Prescription approved per [facility] Refill Protocol.  Kajal COKER RN    Requested Prescriptions   Pending Prescriptions Disp Refills     fluticasone (FLONASE) 50 MCG/ACT spray [Pharmacy Med Name: FLUTICASONE 50MCG NASAL SP (120) RX] 16 mL 0     Sig: SHAKE LIQUID AND USE 1-2 SPRAYS IN EACH NOSTRIL DAILY AS NEEDED FOR RHINITIS OR ALLERGIES.    Inhaled Steroids Protocol Passed    5/17/2018  5:30 PM       Passed - Patient is age 12 or older       Passed - Recent (12 mo) or future (30 days) visit within the authorizing provider's specialty    Patient had office visit in the last 12 months or has a visit in the next 30 days with authorizing provider or within the authorizing provider's specialty.  See \"Patient Info\" tab in inbasket, or \"Choose Columns\" in Meds & Orders section of the refill encounter.                "

## 2018-06-11 ENCOUNTER — OFFICE VISIT (OUTPATIENT)
Dept: OBGYN | Facility: CLINIC | Age: 38
End: 2018-06-11
Payer: COMMERCIAL

## 2018-06-11 VITALS
WEIGHT: 264 LBS | BODY MASS INDEX: 42.43 KG/M2 | DIASTOLIC BLOOD PRESSURE: 84 MMHG | HEIGHT: 66 IN | SYSTOLIC BLOOD PRESSURE: 108 MMHG

## 2018-06-11 DIAGNOSIS — Z30.42 ENCOUNTER FOR MANAGEMENT AND INJECTION OF DEPO-PROVERA: ICD-10-CM

## 2018-06-11 DIAGNOSIS — Z11.51 SCREENING FOR HUMAN PAPILLOMAVIRUS: ICD-10-CM

## 2018-06-11 DIAGNOSIS — Z11.3 ROUTINE SCREENING FOR STI (SEXUALLY TRANSMITTED INFECTION): ICD-10-CM

## 2018-06-11 DIAGNOSIS — Z12.4 CERVICAL CANCER SCREENING: ICD-10-CM

## 2018-06-11 DIAGNOSIS — N92.6 IRREGULAR MENSTRUAL CYCLE: Primary | ICD-10-CM

## 2018-06-11 LAB
BETA HCG QUAL IFA URINE: NEGATIVE
HGB BLD-MCNC: 12.6 G/DL (ref 11.7–15.7)
SPECIMEN SOURCE: NORMAL
WET PREP SPEC: NORMAL

## 2018-06-11 PROCEDURE — G0145 SCR C/V CYTO,THINLAYER,RESCR: HCPCS | Performed by: ADVANCED PRACTICE MIDWIFE

## 2018-06-11 PROCEDURE — 87491 CHLMYD TRACH DNA AMP PROBE: CPT | Performed by: ADVANCED PRACTICE MIDWIFE

## 2018-06-11 PROCEDURE — 84703 CHORIONIC GONADOTROPIN ASSAY: CPT | Performed by: ADVANCED PRACTICE MIDWIFE

## 2018-06-11 PROCEDURE — 96372 THER/PROPH/DIAG INJ SC/IM: CPT | Performed by: ADVANCED PRACTICE MIDWIFE

## 2018-06-11 PROCEDURE — G0476 HPV COMBO ASSAY CA SCREEN: HCPCS | Performed by: ADVANCED PRACTICE MIDWIFE

## 2018-06-11 PROCEDURE — 85018 HEMOGLOBIN: CPT | Performed by: ADVANCED PRACTICE MIDWIFE

## 2018-06-11 PROCEDURE — 36415 COLL VENOUS BLD VENIPUNCTURE: CPT | Performed by: ADVANCED PRACTICE MIDWIFE

## 2018-06-11 PROCEDURE — 87591 N.GONORRHOEAE DNA AMP PROB: CPT | Performed by: ADVANCED PRACTICE MIDWIFE

## 2018-06-11 PROCEDURE — 99203 OFFICE O/P NEW LOW 30 MIN: CPT | Mod: 25 | Performed by: ADVANCED PRACTICE MIDWIFE

## 2018-06-11 PROCEDURE — 87210 SMEAR WET MOUNT SALINE/INK: CPT | Performed by: ADVANCED PRACTICE MIDWIFE

## 2018-06-11 RX ORDER — MEDROXYPROGESTERONE ACETATE 150 MG/ML
150 INJECTION, SUSPENSION INTRAMUSCULAR
Qty: 1 ML | Refills: 0 | OUTPATIENT
Start: 2018-06-11 | End: 2019-01-31

## 2018-06-11 NOTE — MR AVS SNAPSHOT
After Visit Summary   6/11/2018    Maribel Osuna    MRN: [id number]           Patient Information     Date Of Birth          1980        Visit Information        Provider Department      6/11/2018 5:00 PM Brigette Arroyo CNM [facility]        Today's Diagnoses     Irregular menstrual cycle    -  1    Routine screening for STI (sexually transmitted infection)        Screening for human papillomavirus        Cervical cancer screening        Encounter for management and injection of depo-Provera           Follow-ups after your visit        Follow-up notes from your care team     Return if symptoms worsen or fail to improve.      Your next 10 appointments already scheduled     Jun 22, 2018 10:20 AM CDT   YANDEL Extremity with Ethel Ladd PT   [facility] ([place]  )    [address]  [facility] [address]   [phone]            Jul 03, 2018  8:00 AM CDT   YANDEL Extremity with Ethel Ladd PT   [facility] ([place]  )    [address]  [facility] [address]   [phone]            Jul 24, 2018  4:30 PM CDT   YANDEL Extremity with Ethel Ladd PT   [facility] ([place]  )    [address]  [facility] [address]   [phone]            Aug 10, 2018 10:20 AM CDT   YANDEL Extremity with Ethel Ladd PT   [facility] ([place]  )    [address]  [facility] [address]   [phone]            Aug 16, 2018  9:20 AM CDT   PHYSICAL with Ida Murguia MD   [facility] ([facility])    [address] [place]  [facility] [address]   [phone]              Future tests that were ordered for you today     Open Future Orders        Priority Expected Expires Ordered    US  "Pelvic Complete w Transvaginal Routine 2018            Who to contact     If you have questions or need follow up information about today's clinic visit or your schedule please contact [facility] directly at [phone].  Normal or non-critical lab and imaging results will be communicated to you by MyChart, letter or phone within 4 business days after the clinic has received the results. If you do not hear from us within 7 days, please contact the clinic through MyChart or phone. If you have a critical or abnormal lab result, we will notify you by phone as soon as possible.  Submit refill requests through [employer] or call your pharmacy and they will forward the refill request to us. Please allow 3 business days for your refill to be completed.          Additional Information About Your Visit        MyChart Information     [employer]List lets you send messages to your doctor, view your test results, renew your prescriptions, schedule appointments and more. To sign up, go to [email or web address] . Click on \"Log in\" on the left side of the screen, which will take you to the Welcome page. Then click on \"Sign up Now\" on the right side of the page.     You will be asked to enter the access code listed below, as well as some personal information. Please follow the directions to create your username and password.     Your access code is: [email or web address]  Expires: 2018  4:48 PM     Your access code will  in 90 days. If you need help or a new code, please call your [facility] clinic or [phone].        Care EveryWhere ID     This is your Care EveryWhere ID. This could be used by other organizations to access your [facility] medical records  [id number]        Your Vitals Were     Height Last Period Breastfeeding? BMI (Body Mass Index)          5' 5.5\" (1.664 m) 2018 (Exact Date) No 43.26 kg/m2         Blood Pressure from Last 3 Encounters:   18 108/84 "   03/27/18 110/82   03/22/18 132/89    Weight from Last 3 Encounters:   06/11/18 264 lb (119.7 kg)   03/27/18 256 lb 3.2 oz (116.2 kg)   03/22/18 255 lb 3.2 oz (115.8 kg)              We Performed the Following     Beta HCG Qual, Urine - [facility] ([id number])     Chlamydia trachomatis PCR     Hemoglobin     HPV High Risk Types DNA Cervical     INJECTION INTRAMUSCULAR OR SUB-Q     Neisseria gonorrhoeae PCR     PAP imaged thin layer screen     Wet prep          Today's Medication Changes          These changes are accurate as of 6/11/18  6:59 PM.  If you have any questions, ask your nurse or doctor.               Start taking these medicines.        Dose/Directions    medroxyPROGESTERone 150 MG/ML injection   Commonly known as:  DEPO-PROVERA   Used for:  Irregular menstrual cycle   Started by:  Brigette Arroyo CNM        Dose:  150 mg   Inject 1 mL (150 mg) into the muscle every 3 months   Quantity:  1 mL   Refills:  0            Where to get your medicines      Some of these will need a paper prescription and others can be bought over the counter.  Ask your nurse if you have questions.     You don't need a prescription for these medications     medroxyPROGESTERone 150 MG/ML injection                Primary Care Provider Office Phone # Fax #    Ida Inga Murguia [phone] [address] [facility] [address]        Equal Access to Services     [facility]: Hadii kyra Jiménez, waaxda luvic, qaybta kaalmasophia ordaz . [facility] [phone].    ATENCIÓN: Si habla español, tiene a quinonez disposición servicios gratuitos de asistencia lingüística. Llame al [phone].    We comply with applicable federal civil rights laws and [place] laws. We do not discriminate on the basis of race, color, national origin, age, disability, sex, sexual orientation, or gender identity.            Thank you!     Thank you for choosing  [facility]  for your care. Our goal is always to provide you with excellent care. Hearing back from our patients is one way we can continue to improve our services. Please take a few minutes to complete the written survey that you may receive in the mail after your visit with us. Thank you!             Your Updated Medication List - Protect others around you: Learn how to safely use, store and throw away your medicines at www.disposemymeds.org.          This list is accurate as of 6/11/18  6:59 PM.  Always use your most recent med list.                   Brand Name Dispense Instructions for use Diagnosis    ARTIFICIAL TEARS 83-15 % Oint           B-12 1000 MCG Tbcr     100 tablet    Take 100 mg by mouth daily        BENADRYL PO      Take 25 mg by mouth daily as needed        ciclopirox 8 % Soln     6.6 mL    Apply daily to involved nails x 1 year.  Once weekly, removed old medication and roughen nail with file.    Onychomycosis       fluticasone 50 MCG/ACT spray    FLONASE    16 mL    SHAKE LIQUID AND USE 1-2 SPRAYS IN EACH NOSTRIL DAILY AS NEEDED FOR RHINITIS OR ALLERGIES.    Chronic rhinitis       guaiFENesin 600 MG 12 hr tablet    MUCINEX     Take 1,200 mg by mouth 2 times daily        medroxyPROGESTERone 150 MG/ML injection    DEPO-PROVERA    1 mL    Inject 1 mL (150 mg) into the muscle every 3 months    Irregular menstrual cycle       MELATONIN PO      Take 2.5 mg by mouth At Bedtime        Multi-vitamin Tabs tablet     100 tablet    Take 1 tablet by mouth 3 times daily        VITAMIN C PO      Take 500 mg by mouth daily

## 2018-06-11 NOTE — NURSING NOTE
"Chief Complaint   Patient presents with     Consult     First noticed lump on cervix around , but notices it has gotten a little bigger.  Irregular periods since 2017, lighter, longer and more frequent.    Initial /84  Ht 5' 5.5\" (1.664 m)  Wt 264 lb (119.7 kg)  LMP 2018 (Exact Date)  Breastfeeding? No  BMI 43.26 kg/m2 Estimated body mass index is 43.26 kg/(m^2) as calculated from the following:    Height as of this encounter: 5' 5.5\" (1.664 m).    Weight as of this encounter: 264 lb (119.7 kg).  BP completed using cuff size: large        Jennifer Arndt CMA               "
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
## 2018-06-11 NOTE — LETTER
June 21, 2018    Maribel Osuna  [id number]   [address] [id number]  [place] [address]    Dear Maribel,  We are happy to inform you that your PAP smear result from 6/11/18 is normal.  We are now able to do a follow up test on PAP smears. The DNA test is for HPV (Human Papilloma Virus). Cervical cancer is closely linked with certain types of HPV. Your results showed no evidence of high risk HPV.  Therefore we recommend you return in 3 years for your next pap smear.  You will still need to return to the clinic every year for an annual exam and other preventive tests.  Please contact the clinic at [phone] with any questions.  Sincerely,    Brigette Arroyo CNM/paras

## 2018-06-13 LAB
C TRACH DNA SPEC QL NAA+PROBE: NEGATIVE
N GONORRHOEA DNA SPEC QL NAA+PROBE: NEGATIVE
SPECIMEN SOURCE: NORMAL
SPECIMEN SOURCE: NORMAL

## 2018-06-14 LAB
COPATH REPORT: NORMAL
PAP: NORMAL

## 2018-06-18 LAB
FINAL DIAGNOSIS: NORMAL
HPV HR 12 DNA CVX QL NAA+PROBE: NEGATIVE
HPV16 DNA SPEC QL NAA+PROBE: NEGATIVE
HPV18 DNA SPEC QL NAA+PROBE: NEGATIVE
SPECIMEN DESCRIPTION: NORMAL
SPECIMEN SOURCE CVX/VAG CYTO: NORMAL

## 2018-06-22 ENCOUNTER — RADIANT APPOINTMENT (OUTPATIENT)
Dept: ULTRASOUND IMAGING | Facility: CLINIC | Age: 38
End: 2018-06-22
Attending: ADVANCED PRACTICE MIDWIFE
Payer: COMMERCIAL

## 2018-06-22 ENCOUNTER — TELEPHONE (OUTPATIENT)
Dept: OBGYN | Facility: CLINIC | Age: 38
End: 2018-06-22

## 2018-06-22 DIAGNOSIS — N92.6 IRREGULAR MENSTRUAL CYCLE: ICD-10-CM

## 2018-06-22 PROCEDURE — 76830 TRANSVAGINAL US NON-OB: CPT | Performed by: FAMILY MEDICINE

## 2018-06-22 PROCEDURE — 76856 US EXAM PELVIC COMPLETE: CPT | Performed by: FAMILY MEDICINE

## 2018-06-22 NOTE — TELEPHONE ENCOUNTER
Called patient to discuss normal pelvic US and all labs normal. If bleeding has stopped with Depo, could consider continuing Depo [id number]. Could also come in for a birth control consult to discuss other options.   Brigette Arroyo CNM, JARED-BC

## 2018-06-25 ENCOUNTER — NURSE TRIAGE (OUTPATIENT)
Dept: NURSING | Facility: CLINIC | Age: 38
End: 2018-06-25

## 2018-06-25 ENCOUNTER — TELEPHONE (OUTPATIENT)
Dept: OBGYN | Facility: CLINIC | Age: 38
End: 2018-06-25

## 2018-06-25 NOTE — TELEPHONE ENCOUNTER
"Clinic Action Needed:Yes, Please call patient [phone]  Reason for Call:Patient returning call reviewed notes below on normal pelvic ultra sound and labs. Patient reporting vaginal bleeding restarted on 6/23/18.   \"Stringy\" blood, \"Not heavy bleeding.\" Reports feeling bloated. Patient questioning if she needs to be re seen.  Patient Recommendations/Teaching:Advised to call back with any change or increase in symptoms.  Routed to:[facility] OB/GYN Nurse Joel Christy RN  [facility]        "

## 2018-06-25 NOTE — TELEPHONE ENCOUNTER
See [employer] message.  Deena GONZALEZ R.N.  [facility]

## 2018-06-25 NOTE — TELEPHONE ENCOUNTER
Discussed normal pelvic ultrasound and normal lab results with patient. She reports that immediately after the Depo Provera shot on 6/11 bleeding stopped. She noticed slight spotting 3 days ago. Reports it has been light brown spotting since. No heavy bleeding or large clots. Discussed that some spotting is expected on Depo. Reviewed warning signs of heavy bleeding and when to call. Discussed that due to all normal results of testing, the abnormal bleeding and increased acne may be due to perimenopausal changes. Offered to continue Depo or another hormonal birth control for bleeding control and contraception. Patient plans to return in 09/2018 for visit and either Depo or begin another form of contraception.     Brigette Arroyo CNM, JARED-BC

## 2018-06-25 NOTE — PROGRESS NOTES
Pelvic ultrasound normal. Follow up if bleeding persists and/or if she would like to discuss hormonal birth control to regulate cycles. Notify patient please.  Brigette Arroyo CNM, JARED-BC

## 2018-06-25 NOTE — TELEPHONE ENCOUNTER
Called patient to discuss concern of bleeding and pelvic US results. Instructed to call back. Option would be to control bleeding with hormonal forms of birth control. Ultrasound was normal. Can come in for a visit to discuss. Suspect this is related to perimenopausal changes.     Brigette Arroyo CNM, WHNP-BC

## 2018-06-25 NOTE — TELEPHONE ENCOUNTER
"Clinic Action Needed:Yes, Please call patient [phone]  Reason for Call:Patient returning call reviewed notes below on normal pelvic ultra sound and labs. Patient reporting vaginal bleeding restarted on 6/23/18.   \"Stringy\" blood, \"Not heavy bleeding.\" Reports feeling bloated. Patient questioning if she needs to be re seen.  Patient Recommendations/Teaching:Advised to call back with any change or increase in symptoms.  Routed to:[facility] OB/GYN Nurse Joel Christy RN  [facility]    "

## 2018-07-24 ENCOUNTER — THERAPY VISIT (OUTPATIENT)
Dept: PHYSICAL THERAPY | Facility: CLINIC | Age: 38
End: 2018-07-24
Payer: COMMERCIAL

## 2018-07-24 DIAGNOSIS — G89.29 CHRONIC PAIN OF LEFT KNEE: ICD-10-CM

## 2018-07-24 DIAGNOSIS — M25.562 CHRONIC PAIN OF LEFT KNEE: ICD-10-CM

## 2018-07-24 PROCEDURE — 97110 THERAPEUTIC EXERCISES: CPT | Mod: GP | Performed by: PHYSICAL THERAPIST

## 2018-07-24 NOTE — PROGRESS NOTES
Subjective:  HPI                    Objective:  System    Physical Exam    General     ROS    Assessment/Plan:    PROGRESS  REPORT    Progress reporting period is from 5-8-18 to 7-24-18.       SUBJECTIVE   Subjective: Pt returns to PT reporting that her knee became inflammed  about 1 month ago and has set her back     Current Pain level: 3/10.      Initial Pain level: 3/10.   Changes in function:  None  Adverse reaction to treatment or activity: None    OBJECTIVE  Changes noted in objective findings:  The objective findings below are from DOS 7-24-18.  Objective: Reports the knee cap did not pop out as before but got strained and then very inflammed. Has been taping off and on to let her skin heal from the tape. Reports if she does not tape for several days her knee cap will shift and get inflammed. Discussed seeing Dr Calvillo for assessment of the patella. She will look into this. She will continue with her hip strengthening exs as well. Tape off and on to allow her skin time to rest. Plann to see back in 2 weeks.     ASSESSMENT/PLAN  Updated problem list and treatment plan: Diagnosis 1:  R knee pain  Pain -  hot/cold therapy and manual therapy  Decreased strength - therapeutic exercise and therapeutic activities  Edema - cold therapy  Impaired muscle performance - neuro re-education  Decreased function - therapeutic activities  STG/LTGs have been met or progress has been made towards goals:  None  Assessment of Progress: The patient's condition has potential to improve.  Self Management Plans:  Patient has been instructed in a home treatment program.  I have re-evaluated this patient and find that the nature, scope, duration and intensity of the therapy is appropriate for the medical condition of the patient.  Maribel continues to require the following intervention to meet STG and LTG's:  PT    Recommendations:  This patient would benefit from continued therapy.     Frequency:  1 X a month, once daily  Duration:  for  2 months        Please refer to the daily flowsheet for treatment today, total treatment time and time spent performing 1:1 timed codes.

## 2018-07-24 NOTE — LETTER
[facility]  [address]  [facility] [id number] [phone]    2018    Re: Maribel Osuna   :  1980  MRN:  [id number]   REFERRING PHYSICIAN:   To Torres    [facility]    Date of Initial Evaluation:  18  Visits:  Rxs Used: 2  Reason for Referral:  Chronic pain of left knee    PROGRESS  REPORT  Progress reporting period is from 18 to 18.       SUBJECTIVE  Subjective: Pt returns to PT reporting that her knee became inflammed  about 1 month ago and has set her back    Current Pain level: 3/10.     Initial Pain level: 3/10.   Changes in function:  None  Adverse reaction to treatment or activity: None    OBJECTIVE  Changes noted in objective findings:  The objective findings below are from DOS 18.  Objective: Reports the knee cap did not pop out as before but got strained and then very inflammed. Has been taping off and on to let her skin heal from the tape. Reports if she does not tape for several days her knee cap will shift and get inflammed. Discussed seeing Dr Calvillo for assessment of the patella. She will look into this. She will continue with her hip strengthening exs as well. Tape off and on to allow her skin time to rest. Plann to see back in 2 weeks.     ASSESSMENT/PLAN  Updated problem list and treatment plan: Diagnosis 1:  R knee pain  Pain -  hot/cold therapy and manual therapy  Decreased strength - therapeutic exercise and therapeutic activities  Edema - cold therapy  Impaired muscle performance - neuro re-education  Decreased function - therapeutic activities  STG/LTGs have been met or progress has been made towards goals:  None  Assessment of Progress: The patient's condition has potential to improve.  Self Management Plans:  Patient has been instructed in a home treatment program.  I have re-evaluated this patient and find that the nature, scope, duration and  intensity of the therapy is appropriate for the medical condition of the patient.  Maribel continues to require the following intervention to meet STG and LTG's:  PT      Re: Maribel Osuna   :  1980    Recommendations:  This patient would benefit from continued therapy.     Frequency:  1 X a month, once daily  Duration:  for 2 months    Thank you for your referral.    INQUIRIES  Therapist: Ethel Ladd PT, hospitals   [facility]  [address] [place] [address] [id number]  [facility] [address]  Phone: [phone]  Fax: [phone]

## 2018-07-24 NOTE — MR AVS SNAPSHOT
After Visit Summary   7/24/2018    Maribel Osuna    MRN: [id number]           Patient Information     Date Of Birth          1980        Visit Information        Provider Department      7/24/2018 4:30 PM Ethel Ladd, PT [place] [facility]        Today's Diagnoses     Chronic pain of left knee           Follow-ups after your visit        Your next 10 appointments already scheduled     Aug 10, 2018 10:20 AM CDT   YANDEL Extremity with Ethel Ladd PT   [facility] ([facility]  )    [address]  [facility] [address] [phone]            Aug 16, 2018  9:20 AM CDT   PHYSICAL with Ida Murguia MD   [facility] ([facility]    [address]  [facility] [id number] [phone]            Aug 28, 2018  9:00 AM CDT   Office Visit with Brigette Arroyo CNM   [facility]    [address] [address] [phone]           Bring a current list of meds and any records pertaining to this visit. For Physicals, please bring immunization records and any forms needing to be filled out. Please arrive 10 minutes early to complete paperwork.              Who to contact     If you have questions or need follow up information about today's clinic visit or your schedule please contact [place] [facility]ERAP[facility] directly at [phone].  Normal or non-critical lab and imaging results will be communicated to you by MyChart, letter or phone within 4 business days after the clinic has received the results. If you do not hear from us within 7 days, please contact the clinic through MyChart or phone. If you have a critical or abnormal lab result, we will notify you by phone as soon as possible.  Submit refill requests through [employer] or  call your pharmacy and they will forward the refill request to us. Please allow 3 business days for your refill to be completed.          Additional Information About Your Visit        [employer] Information     [employer] gives you secure access to your electronic health record. If you see a primary care provider, you can also send messages to your care team and make appointments. If you have questions, please call your primary care clinic.  If you do not have a primary care provider, please call [phone] and they will assist you.        Care EveryWhere ID     This is your Care EveryWhere ID. This could be used by other organizations to access your [place] medical records  [id number]         Blood Pressure from Last 3 Encounters:   06/11/18 108/84   03/27/18 110/82   03/22/18 132/89    Weight from Last 3 Encounters:   06/11/18 119.7 kg (264 lb)   03/27/18 116.2 kg (256 lb 3.2 oz)   03/22/18 115.8 kg (255 lb 3.2 oz)              We Performed the Following     YANDEL PROGRESS NOTES REPORT     THERAPEUTIC EXERCISES        Primary Care Provider Office Phone # Fax #    Ida Inga Murguia [phone] [address] [facility] [address]        Equal Access to Services     MIKI VALDEZ : Hadii kyra martínez hadasho Soomaali, waaxda luqadaha, qaybta kaalmada adeegyada, sophia benitez. So [facility] [phone].    ATENCIÓN: Si habla español, tiene a quinonez disposición servicios gratuitos de asistencia lingüística. Llame al [phone].    We comply with applicable federal civil rights laws and [place] laws. We do not discriminate on the basis of race, color, national origin, age, disability, sex, sexual orientation, or gender identity.            Thank you!     Thank you for choosing [facility] THERAP[facility]  for your care. Our goal is always to provide you with excellent care. Hearing back from our patients is one way we can continue to improve our  services. Please take a few minutes to complete the written survey that you may receive in the mail after your visit with us. Thank you!             Your Updated Medication List - Protect others around you: Learn how to safely use, store and throw away your medicines at www.disposemymeds.org.          This list is accurate as of 7/24/18  5:08 PM.  Always use your most recent med list.                   Brand Name Dispense Instructions for use Diagnosis    ARTIFICIAL TEARS 83-15 % Oint           B-12 1000 MCG Tbcr     100 tablet    Take 100 mg by mouth daily        BENADRYL PO      Take 25 mg by mouth daily as needed        ciclopirox 8 % Soln     6.6 mL    Apply daily to involved nails x 1 year.  Once weekly, removed old medication and roughen nail with file.    Onychomycosis       fluticasone 50 MCG/ACT spray    FLONASE    16 mL    SHAKE LIQUID AND USE 1-2 SPRAYS IN EACH NOSTRIL DAILY AS NEEDED FOR RHINITIS OR ALLERGIES.    Chronic rhinitis       guaiFENesin 600 MG 12 hr tablet    MUCINEX     Take 1,200 mg by mouth 2 times daily        medroxyPROGESTERone 150 MG/ML injection    DEPO-PROVERA    1 mL    Inject 1 mL (150 mg) into the muscle every 3 months    Irregular menstrual cycle       MELATONIN PO      Take 2.5 mg by mouth At Bedtime        Multi-vitamin Tabs tablet     100 tablet    Take 1 tablet by mouth 3 times daily        VITAMIN C PO      Take 500 mg by mouth daily

## 2018-08-28 ENCOUNTER — OFFICE VISIT (OUTPATIENT)
Dept: OBGYN | Facility: CLINIC | Age: 38
End: 2018-08-28
Payer: COMMERCIAL

## 2018-08-28 VITALS
HEIGHT: 66 IN | BODY MASS INDEX: 43.23 KG/M2 | SYSTOLIC BLOOD PRESSURE: 116 MMHG | DIASTOLIC BLOOD PRESSURE: 74 MMHG | WEIGHT: 269 LBS

## 2018-08-28 DIAGNOSIS — Z30.8 ENCOUNTER FOR OTHER CONTRACEPTIVE MANAGEMENT: Primary | ICD-10-CM

## 2018-08-28 PROCEDURE — 99213 OFFICE O/P EST LOW 20 MIN: CPT | Mod: 25 | Performed by: ADVANCED PRACTICE MIDWIFE

## 2018-08-28 PROCEDURE — 96372 THER/PROPH/DIAG INJ SC/IM: CPT | Performed by: ADVANCED PRACTICE MIDWIFE

## 2018-08-28 NOTE — MR AVS SNAPSHOT
"              After Visit Summary   8/28/2018    Maribel Osuna    MRN: [id number]           Patient Information     Date Of Birth          1980        Visit Information        Provider Department      8/28/2018 9:00 AM Brigette Arroyo CNM [facility]        Today's Diagnoses     Encounter for other contraceptive management    -  1       Follow-ups after your visit        Who to contact     If you have questions or need follow up information about today's clinic visit or your schedule please contact [facility] directly at [phone].  Normal or non-critical lab and imaging results will be communicated to you by [employer], letter or phone within 4 business days after the clinic has received the results. If you do not hear from us within 7 days, please contact the clinic through [employer] or phone. If you have a critical or abnormal lab result, we will notify you by phone as soon as possible.  Submit refill requests through [employer] or call your pharmacy and they will forward the refill request to us. Please allow 3 business days for your refill to be completed.          Additional Information About Your Visit        MyChart Information     [employer] gives you secure access to your electronic health record. If you see a primary care provider, you can also send messages to your care team and make appointments. If you have questions, please call your primary care clinic.  If you do not have a primary care provider, please call [phone] and they will assist you.        Care EveryWhere ID     This is your Care EveryWhere ID. This could be used by other organizations to access your [place] medical records  [id number]        Your Vitals Were     Height Last Period BMI (Body Mass Index)             5' 5.5\" (1.664 m) (LMP Unknown) 44.08 kg/m2          Blood Pressure from Last 3 Encounters:   08/28/18 116/74   06/11/18 108/84   03/27/18 110/82    Weight from " Last 3 Encounters:   08/28/18 269 lb (122 kg)   06/11/18 264 lb (119.7 kg)   03/27/18 256 lb 3.2 oz (116.2 kg)              Today, you had the following     No orders found for display       Primary Care Provider Office Phone # Fax #    Ida Inga Murguia [phone]       [address] [facility] [address]        Equal Access to Services     [facility]: Hadii aad ku hadasho Soomaali, waaxda luqadaha, qaybta kaalmada adeegyada, waxay idiin hayaan adeeg khjermain la'memocecile . So [facility] [phone].    ATENCIÓN: Si trishla espvibha, tiene a quinonez disposición servicios gratuitos de asistencia lingüística. Ll[facility] [phone].    We comply with applicable federal civil rights laws and [place] laws. We do not discriminate on the basis of race, color, national origin, age, disability, sex, sexual orientation, or gender identity.            Thank you!     Thank you for choosing [facility]  for your care. Our goal is always to provide you with excellent care. Hearing back from our patients is one way we can continue to improve our services. Please take a few minutes to complete the written survey that you may receive in the mail after your visit with us. Thank you!             Your Updated Medication List - Protect others around you: Learn how to safely use, store and throw away your medicines at www.disposemymeds.org.          This list is accurate as of 8/28/18 11:07 AM.  Always use your most recent med list.                   Brand Name Dispense Instructions for use Diagnosis    ARTIFICIAL TEARS 83-15 % Oint           B-12 1000 MCG Tbcr     100 tablet    Take 100 mg by mouth daily        BENADRYL PO      Take 25 mg by mouth daily as needed        ciclopirox 8 % Soln     6.6 mL    Apply daily to involved nails x 1 year.  Once weekly, removed old medication and roughen nail with file.    Onychomycosis       fluticasone 50 MCG/ACT spray    FLONASE    16 mL    SHAKE LIQUID AND USE  1-2 SPRAYS IN EACH NOSTRIL DAILY AS NEEDED FOR RHINITIS OR ALLERGIES.    Chronic rhinitis       guaiFENesin 600 MG 12 hr tablet    MUCINEX     Take 1,200 mg by mouth 2 times daily        medroxyPROGESTERone 150 MG/ML injection    DEPO-PROVERA    1 mL    Inject 1 mL (150 mg) into the muscle every 3 months    Irregular menstrual cycle       MELATONIN PO      Take 2.5 mg by mouth At Bedtime        Multi-vitamin Tabs tablet     100 tablet    Take 1 tablet by mouth 3 times daily        SYSTANE OVERNIGHT THERAPY 0.3 % Gel ophthalmic gel   Generic drug:  hypromellose           VITAMIN C PO      Take 500 mg by mouth daily

## 2018-08-28 NOTE — PROGRESS NOTES
SUBJECTIVE:   Maribel Osuna is a 37 year old who presents to the clinic for discussion of birth control methods. She has had irregular spotting x 6 months. She had a normal pelvic ultrasound and labs in 06/2018. She tried Depo at that time, which did stop the bleeding x 2-3 days, but light spotting has returned.   She has used the following methods in the past: Depo Provera  Today she is interested in discussing RUCHI, Mirena IUD and Depo Provera  Histories reviewed and updated  Past Medical History:   Diagnosis Date     Abnormal EKG      Anxiety 3/6/2017     Common wart 9/17/2014     Fertility testing 7/11/2014     Left knee pain 7/11/2014     Lipoma of skin and subcutaneous tissue 7/9/2014     Past Surgical History:   Procedure Laterality Date     EXCISE LESION TRUNK N/A 9/12/2014    Procedure: EXCISE LESION TRUNK;  Surgeon: Denver Cooper MD;  Location: [facility]     Social History     Social History     Marital status:      Spouse name: N/A     Number of children: 0     Years of education: N/A     Occupational History     Not on file.     Social History Main Topics     Smoking status: Never Smoker     Smokeless tobacco: Never Used     Alcohol use 0.0 oz/week     0 Standard drinks or equivalent per week      Comment: 1 per month     Drug use: No     Sexual activity: Yes     Partners: Male     Other Topics Concern     Parent/Sibling W/ Cabg, Mi Or Angioplasty Before 65f 55m? No     unknown for sure     Social History Narrative     Family History   Problem Relation Age of Onset     Hypertension Mother      Allergies Mother      Arthritis Mother      rheumatoid     Depression Mother      Fa, mgf, mgm, pgm, pgf sis, bro all family     Lipids Mother      Psychotic Disorder Mother      all family     Hypertension Father      Neurologic Disorder Father      Glaucoma Father      Vision Loss Father      C.A.D. Maternal Grandmother      HEART DISEASE Maternal Grandmother      Alcohol/Drug Maternal Grandmother       "Gynecology Maternal Grandmother      Lipids Maternal Grandmother      Obesity Maternal Grandmother      Cancer - colorectal Paternal Grandmother      Alcohol/Drug Paternal Grandmother      Alcohol/Drug Maternal Grandfather      Alcohol/Drug Paternal Grandfather      Allergies Sister      Allergies Brother      Diabetes No family hx of      Coronary Artery Disease No family hx of      Hyperlipidemia No family hx of      Cerebrovascular Disease No family hx of      Breast Cancer No family hx of      Colon Cancer No family hx of      Prostate Cancer No family hx of      Other Cancer No family hx of      Anxiety Disorder No family hx of      Mental Illness No family hx of      Substance Abuse No family hx of      Anesthesia Reaction No family hx of      Asthma No family hx of      Osteoperosis No family hx of      Genetic Disorder No family hx of      Thyroid Disease No family hx of      Unknown/Adopted No family hx of      Macular Degeneration No family hx of        Menstrual History:  Menses irregular x 6-12 months, had Depo 3 months ago. She has irregular spotting and some changes in increased acne and hair thinning. TSH normal. Possibly due to perimenopausal changes. Discussed other signs of perimenopause- denies hot flashes, night sweats, and vaginal dryness. Desires contraception to control irregular spotting.       Denies the following contraindications to estrogen/progesterone combined contraception:  Migraine with aura  Smoking over age 35  Liver disease  Personal history of blood clot or stroke   History of heart disease  History of breast cancer  Undiagnosed vaginal bleeding  Hypertension  Pregnancy    Health maintenance updated:  yes    ROS:   12 point review of systems negative other than symptoms noted below.    EXAM:  /74  Ht 5' 5.5\" (1.664 m)  Wt 269 lb (122 kg)  LMP  (LMP Unknown)  BMI 44.08 kg/m2  Body mass index is 44.08 kg/(m^2).    ASSESSMENT/PLAN:    ICD-10-CM    1. Encounter for other " contraceptive management Z30.8      There are no contraindications to the use of RUCHI, Mirena IUD, Depo Provera, Nexplanon and Patch. Discussed that RUCHI and Patch would most likely control abnormal bleeding and cause a short/light period monthly. IUD would keep uterine lining thin and periods are typically light and short. Nexplanon and Depo may cause irregular bleeding. Discussed all options related to her desire to control abnormal spotting.     Patient interested in Mirena IUD. Recommended to take 600mg prior to insertion. Handout provided. Return to clinic if insertion desired.     Received Depo today.     COUNSELING:  Reviewed risks and benefits of contraceptive use  Discussed proper use of chosen method  Handouts/Instrucions provided    Brigette Arroyo CNM, JARED-BC    10 minutes was spent face to face with the patient today discussing her history, diagnosis, and follow-up plan as noted above. Over 50% of the visit was spent in counseling and coordination of care.    Total Visit Time: 10 minutes.

## 2018-08-28 NOTE — NURSING NOTE
"Chief Complaint   Patient presents with     Follow Up For     discuss birth control options       Initial /74  Ht 5' 5.5\" (1.664 m)  Wt 269 lb (122 kg)  LMP  (LMP Unknown)  BMI 44.08 kg/m2 Estimated body mass index is 44.08 kg/(m^2) as calculated from the following:    Height as of this encounter: 5' 5.5\" (1.664 m).    Weight as of this encounter: 269 lb (122 kg).  BP completed using cuff size: large        Jennifer Arndt CMA               "

## 2018-11-26 ENCOUNTER — OFFICE VISIT (OUTPATIENT)
Dept: FAMILY MEDICINE | Facility: CLINIC | Age: 38
End: 2018-11-26
Payer: COMMERCIAL

## 2018-11-26 VITALS
DIASTOLIC BLOOD PRESSURE: 88 MMHG | OXYGEN SATURATION: 100 % | SYSTOLIC BLOOD PRESSURE: 142 MMHG | TEMPERATURE: 98.1 F | HEART RATE: 104 BPM | BODY MASS INDEX: 43.52 KG/M2 | WEIGHT: 265.56 LBS | RESPIRATION RATE: 18 BRPM

## 2018-11-26 DIAGNOSIS — R09.81 CONGESTED NOSE: ICD-10-CM

## 2018-11-26 DIAGNOSIS — J98.01 ACUTE BRONCHOSPASM: Primary | ICD-10-CM

## 2018-11-26 DIAGNOSIS — J45.21 MILD INTERMITTENT ASTHMA WITH ACUTE EXACERBATION: ICD-10-CM

## 2018-11-26 DIAGNOSIS — J32.9 SINOBRONCHITIS: ICD-10-CM

## 2018-11-26 DIAGNOSIS — J31.0 CHRONIC RHINITIS: ICD-10-CM

## 2018-11-26 DIAGNOSIS — J40 SINOBRONCHITIS: ICD-10-CM

## 2018-11-26 PROBLEM — J45.30 MILD PERSISTENT ASTHMA WITHOUT COMPLICATION: Status: ACTIVE | Noted: 2018-11-26

## 2018-11-26 LAB
DEPRECATED S PYO AG THROAT QL EIA: NORMAL
FLUAV+FLUBV AG SPEC QL: NEGATIVE
FLUAV+FLUBV AG SPEC QL: NEGATIVE
SPECIMEN SOURCE: NORMAL
SPECIMEN SOURCE: NORMAL

## 2018-11-26 PROCEDURE — 87804 INFLUENZA ASSAY W/OPTIC: CPT | Performed by: FAMILY MEDICINE

## 2018-11-26 PROCEDURE — 99214 OFFICE O/P EST MOD 30 MIN: CPT | Performed by: FAMILY MEDICINE

## 2018-11-26 PROCEDURE — 87081 CULTURE SCREEN ONLY: CPT | Performed by: FAMILY MEDICINE

## 2018-11-26 PROCEDURE — 87880 STREP A ASSAY W/OPTIC: CPT | Performed by: FAMILY MEDICINE

## 2018-11-26 RX ORDER — AZITHROMYCIN 250 MG/1
TABLET, FILM COATED ORAL
Qty: 6 TABLET | Refills: 0 | Status: SHIPPED | OUTPATIENT
Start: 2018-11-26 | End: 2018-12-04

## 2018-11-26 RX ORDER — AMOXICILLIN 875 MG
875 TABLET ORAL 2 TIMES DAILY
Qty: 20 TABLET | Refills: 0 | Status: SHIPPED | OUTPATIENT
Start: 2018-11-26 | End: 2019-01-31

## 2018-11-26 RX ORDER — PREDNISONE 20 MG/1
20 TABLET ORAL DAILY
Qty: 5 TABLET | Refills: 0 | Status: SHIPPED | OUTPATIENT
Start: 2018-11-26 | End: 2018-12-04

## 2018-11-26 RX ORDER — ALBUTEROL SULFATE 90 UG/1
2 AEROSOL, METERED RESPIRATORY (INHALATION) EVERY 6 HOURS PRN
Qty: 1 INHALER | Refills: 1 | Status: SHIPPED | OUTPATIENT
Start: 2018-11-26 | End: 2019-01-28

## 2018-11-26 RX ORDER — OMEGA-3S/DHA/EPA/FISH OIL 980-1400MG
CAPSULE,DELAYED RELEASE (ENTERIC COATED) ORAL
COMMUNITY

## 2018-11-26 RX ORDER — FLUTICASONE PROPIONATE 50 MCG
SPRAY, SUSPENSION (ML) NASAL
Qty: 16 ML | Refills: 1 | Status: SHIPPED | OUTPATIENT
Start: 2018-11-26 | End: 2019-01-26

## 2018-11-26 RX ORDER — MONTELUKAST SODIUM 10 MG/1
10 TABLET ORAL AT BEDTIME
Qty: 30 TABLET | Refills: 11 | Status: SHIPPED | OUTPATIENT
Start: 2018-11-26 | End: 2019-02-25

## 2018-11-26 NOTE — MR AVS SNAPSHOT
After Visit Summary   11/26/2018    Maribel Osuna    MRN: [id number]           Patient Information     Date Of Birth          1980        Visit Information        Provider Department      11/26/2018 2:20 PM Ida Murguia MD [facility]        Today's Diagnoses     Acute bronchospasm    -  1    Sinobronchitis        Chronic rhinitis        Mild intermittent asthma with acute exacerbation        Congested nose           Follow-ups after your visit        Follow-up notes from your care team     Return in about 2 weeks (around 12/10/2018) for asthma recheck.      Who to contact     If you have questions or need follow up information about today's clinic visit or your schedule please contact [facility] directly at [phone].  Normal or non-critical lab and imaging results will be communicated to you by [employer], letter or phone within 4 business days after the clinic has received the results. If you do not hear from us within 7 days, please contact the clinic through [employer] or phone. If you have a critical or abnormal lab result, we will notify you by phone as soon as possible.  Submit refill requests through [employer] or call your pharmacy and they will forward the refill request to us. Please allow 3 business days for your refill to be completed.          Additional Information About Your Visit        MyChart Information     [employer] gives you secure access to your electronic health record. If you see a primary care provider, you can also send messages to your care team and make appointments. If you have questions, please call your primary care clinic.  If you do not have a primary care provider, please call [phone] and they will assist you.        Care EveryWhere ID     This is your Care EveryWhere ID. This could be used by other organizations to access your [place] medical records  [id number]        Your Vitals Were     Pulse Temperature Respirations Pulse  Oximetry Breastfeeding? BMI (Body Mass Index)    104 98.1  F (36.7  C) (Oral) 18 100% No 43.52 kg/m2       Blood Pressure from Last 3 Encounters:   11/26/18 142/88   08/28/18 116/74   06/11/18 108/84    Weight from Last 3 Encounters:   11/26/18 265 lb 9 oz (120.5 kg)   08/28/18 269 lb (122 kg)   06/11/18 264 lb (119.7 kg)              We Performed the Following     Beta strep group A culture     Influenza A/B antigen     Rapid strep screen          Today's Medication Changes          These changes are accurate as of 11/26/18 11:59 PM.  If you have any questions, ask your nurse or doctor.               Start taking these medicines.        Dose/Directions    albuterol 108 (90 Base) MCG/ACT inhaler   Commonly known as:  PROAIR HFA/PROVENTIL HFA/VENTOLIN HFA   Used for:  Acute bronchospasm   Started by:  Ida Murguia MD        Dose:  2 puff   Inhale 2 puffs into the lungs every 6 hours as needed for shortness of breath / dyspnea or wheezing   Quantity:  1 Inhaler   Refills:  1       amoxicillin 875 MG tablet   Commonly known as:  AMOXIL   Used for:  Sinobronchitis   Started by:  Ida Murguia MD        Dose:  875 mg   Take 1 tablet (875 mg) by mouth 2 times daily   Quantity:  20 tablet   Refills:  0       azithromycin 250 MG tablet   Commonly known as:  ZITHROMAX   Used for:  Sinobronchitis   Started by:  Ida Murguia MD        2Tabs day 1 &  Than 1TAB  once daily for 4 more day   Quantity:  6 tablet   Refills:  0       montelukast 10 MG tablet   Commonly known as:  SINGULAIR   Started by:  Ida Murguia MD        Dose:  10 mg   Take 1 tablet (10 mg) by mouth At Bedtime   Quantity:  30 tablet   Refills:  11       predniSONE 20 MG tablet   Commonly known as:  DELTASONE   Used for:  Acute bronchospasm   Started by:  Ida Murguia MD        Dose:  20 mg   Take 1 tablet (20 mg) by mouth daily   Quantity:  5 tablet   Refills:  0            Where to get your medicines      These  medications were sent to [facility] [id number] [facility] [address] AT [facility] [address], [facility] [address]     Phone:  [phone]     albuterol 108 (90 Base) MCG/ACT inhaler    amoxicillin 875 MG tablet    azithromycin 250 MG tablet    fluticasone 50 MCG/ACT nasal spray    montelukast 10 MG tablet    predniSONE 20 MG tablet                Primary Care Provider Office Phone # Fax #    Ida Inga Murguia [phone] [address] [facility] [address]        Equal Access to Services     MIKI VALDEZ : Hadii kyra ku hadasho Soyola, waaxda luqadaha, qaybta kaalmada mariselda, sophia benitez. So [facility] [phone].    ATENCIÓN: Si habla español, tiene a quinonez disposición servicios gratuitos de asistencia lingüística. [facility] [phone].    We comply with applicable federal civil rights laws and [place] laws. We do not discriminate on the basis of race, color, national origin, age, disability, sex, sexual orientation, or gender identity.            Thank you!     Thank you for choosing [facility]  for your care. Our goal is always to provide you with excellent care. Hearing back from our patients is one way we can continue to improve our services. Please take a few minutes to complete the written survey that you may receive in the mail after your visit with us. Thank you!             Your Updated Medication List - Protect others around you: Learn how to safely use, store and throw away your medicines at www.disposemymeds.org.          This list is accurate as of 11/26/18 11:59 PM.  Always use your most recent med list.                   Brand Name Dispense Instructions for use Diagnosis    albuterol 108 (90 Base) MCG/ACT inhaler    PROAIR HFA/PROVENTIL HFA/VENTOLIN HFA    1 Inhaler    Inhale 2 puffs into the lungs every 6 hours as needed for shortness of breath / dyspnea or wheezing    Acute  bronchospasm       amoxicillin 875 MG tablet    AMOXIL    20 tablet    Take 1 tablet (875 mg) by mouth 2 times daily    Sinobronchitis       ARTIFICIAL TEARS 83-15 % Oint           azithromycin 250 MG tablet    ZITHROMAX    6 tablet    2Tabs day 1 &  Than 1TAB  once daily for 4 more day    Sinobronchitis       B-12 1000 MCG Tbcr     100 tablet    Take 100 mg by mouth daily        BENADRYL PO      Take 25 mg by mouth daily as needed        ciclopirox 8 % external solution    PENLAC    6.6 mL    Apply daily to involved nails x 1 year.  Once weekly, removed old medication and roughen nail with file.    Onychomycosis       FISH OIL + D3 7905-0123 MG-UNIT Caps       Chronic rhinitis       fluticasone 50 MCG/ACT nasal spray    FLONASE    16 mL    SHAKE LIQUID AND USE 1-2 SPRAYS IN EACH NOSTRIL DAILY AS NEEDED FOR RHINITIS OR ALLERGIES.    Chronic rhinitis       guaiFENesin 600 MG 12 hr tablet    MUCINEX     Take 1,200 mg by mouth 2 times daily        medroxyPROGESTERone 150 MG/ML IM injection    DEPO-PROVERA    1 mL    Inject 1 mL (150 mg) into the muscle every 3 months    Irregular menstrual cycle       MELATONIN PO      Take 2.5 mg by mouth At Bedtime        montelukast 10 MG tablet    SINGULAIR    30 tablet    Take 1 tablet (10 mg) by mouth At Bedtime        Multi-vitamin tablet     100 tablet    Take 1 tablet by mouth 3 times daily        predniSONE 20 MG tablet    DELTASONE    5 tablet    Take 1 tablet (20 mg) by mouth daily    Acute bronchospasm       SYSTANE OVERNIGHT THERAPY 0.3 % Gel ophthalmic gel   Generic drug:  hypromellose           VITAMIN C PO      Take 500 mg by mouth daily

## 2018-11-26 NOTE — PROGRESS NOTES
SUBJECTIVE:   Maribel Osuna is a 38 year old female who presents to clinic today for the following health issues:      RESPIRATORY SYMPTOMS      Duration: 3x weeks    Description  nasal congestion, wheezing and  Shortness of breath, facial pressure, fatigued     Severity: moderate    Accompanying signs and symptoms: fatigued and dizzy     History (predisposing factors):  none    Precipitating or alleviating factors: None    Therapies tried and outcome:  OTC Mucinex   PROBLEMS TO ADD ON...  She is a biker since past 1 month as his main mode of transportation.  In past 2-3 weeks she did feel short of breath with biking especially in the past couple weeks also feels she is wheezing more.  She is noted mold in her apartment bathroom  and baseboards, and now in winter months when there is less outdoor activity she does find herself wheezing or short of breath.  History of cat, dust, mold, tress- allergies.Used to get allergy shots  She has cats, they are indoor and she does not feel she is allergic to them            Problem list and histories reviewed & adjusted, as indicated.  Additional history: as documented    Patient Active Problem List   Diagnosis     Lipoma of skin and subcutaneous tissue     Fertility testing     Common wart     Anxiety     PTSD (post-traumatic stress disorder)     Abnormal EKG     Chest discomfort     Morbid obesity (H)     Mild persistent asthma without complication     Past Surgical History:   Procedure Laterality Date     EXCISE LESION TRUNK N/A 9/12/2014    Procedure: EXCISE LESION TRUNK;  Surgeon: Denver Cooper MD;  Location: [facility]       Social History   Substance Use Topics     Smoking status: Never Smoker     Smokeless tobacco: Never Used     Alcohol use 0.0 oz/week     0 Standard drinks or equivalent per week      Comment: 1 per month     Family History   Problem Relation Age of Onset     Hypertension Mother      Allergies Mother      Arthritis Mother      rheumatoid     Depression  Mother      Fa, mgf, mgm, pgm, pgf sis, bro all family     Lipids Mother      Psychotic Disorder Mother      all family     Hypertension Father      Neurologic Disorder Father      Glaucoma Father      Vision Loss Father      C.A.D. Maternal Grandmother      HEART DISEASE Maternal Grandmother      Alcohol/Drug Maternal Grandmother      Gynecology Maternal Grandmother      Lipids Maternal Grandmother      Obesity Maternal Grandmother      Cancer - colorectal Paternal Grandmother      Alcohol/Drug Paternal Grandmother      Alcohol/Drug Maternal Grandfather      Alcohol/Drug Paternal Grandfather      Allergies Sister      Allergies Brother      Diabetes No family hx of      Coronary Artery Disease No family hx of      Hyperlipidemia No family hx of      Cerebrovascular Disease No family hx of      Breast Cancer No family hx of      Colon Cancer No family hx of      Prostate Cancer No family hx of      Other Cancer No family hx of      Anxiety Disorder No family hx of      Mental Illness No family hx of      Substance Abuse No family hx of      Anesthesia Reaction No family hx of      Asthma No family hx of      Osteoporosis No family hx of      Genetic Disorder No family hx of      Thyroid Disease No family hx of      Unknown/Adopted No family hx of      Macular Degeneration No family hx of            Reviewed and updated as needed this visit by clinical staff       Reviewed and updated as needed this visit by Provider         ROS:  Constitutional, HEENT, cardiovascular, pulmonary, gi and gu systems are negative, except as otherwise noted.    OBJECTIVE:     /88 (BP Location: Right arm, Patient Position: Sitting, Cuff Size: Adult Regular)  Pulse 104  Temp 98.1  F (36.7  C) (Oral)  Resp 18  Wt 265 lb 9 oz (120.5 kg)  SpO2 100%  Breastfeeding? No  BMI 43.52 kg/m2  Body mass index is 43.52 kg/(m^2).  GENERAL: healthy, alert and no distress  EYES: Eyes grossly normal to inspection, PERRL and conjunctivae and  sclerae normal  HENT: ear canals and TM's normal, nose and mouth without ulcers or lesions  NECK: no adenopathy, no asymmetry, masses, or scars and thyroid normal to palpation  RESP: scattered wheezing without crackles  CV: regular rate and rhythm  ABDOMEN: soft, nontender, no hepatosplenomegaly, no masses and bowel sounds normal    ASSESSMENT/PLAN:   Moderate persistent asthma with acute exacerbation  Plan: history of asthma- & multiple environmental allergies    - predniSONE (DELTASONE) 20 MG tablet; Take 1 tablet (20 mg) by mouth daily  - montelukast (SINGULAIR) 10 MG tablet; Take 1 tablet (10 mg) by mouth At Bedtime  -avoid allergen exposure as best as possible   -follow up in 2 weeks, earlier if more concerns    Acute bronchospasm  - predniSONE (DELTASONE) 20 MG tablet; Take 1 tablet (20 mg) by mouth daily  - albuterol (PROAIR HFA/PROVENTIL HFA/VENTOLIN HFA) 108 (90 Base) MCG/ACT inhaler; Inhale 2 puffs into the lungs every 6 hours as needed for shortness of breath / dyspnea or wheezing    Sinobronchitis  Plan: if above treatment- does not help and there s productive sputum and sinus headache   - azithromycin (ZITHROMAX) 250 MG tablet; 2Tabs day 1 &  Than 1TAB  once daily for 4 more day    Chronic rhinitis    - fluticasone (FLONASE) 50 MCG/ACT nasal spray; SHAKE LIQUID AND USE 1-2 SPRAYS IN EACH NOSTRIL DAILY AS NEEDED FOR RHINITIS OR ALLERGIES.        Potential medication side effects were discussed with the patient; let me know if any occur.    Ida Murguia MD  [facility]

## 2018-11-27 LAB
BACTERIA SPEC CULT: NORMAL
SPECIMEN SOURCE: NORMAL

## 2018-12-04 ENCOUNTER — TELEPHONE (OUTPATIENT)
Dept: FAMILY MEDICINE | Facility: CLINIC | Age: 38
End: 2018-12-04

## 2018-12-04 ENCOUNTER — OFFICE VISIT (OUTPATIENT)
Dept: FAMILY MEDICINE | Facility: CLINIC | Age: 38
End: 2018-12-04
Payer: COMMERCIAL

## 2018-12-04 VITALS
TEMPERATURE: 98.2 F | HEIGHT: 66 IN | WEIGHT: 268.1 LBS | HEART RATE: 132 BPM | OXYGEN SATURATION: 100 % | BODY MASS INDEX: 43.09 KG/M2 | DIASTOLIC BLOOD PRESSURE: 96 MMHG | SYSTOLIC BLOOD PRESSURE: 128 MMHG

## 2018-12-04 DIAGNOSIS — F41.9 ANXIETY: Primary | ICD-10-CM

## 2018-12-04 DIAGNOSIS — N93.8 DUB (DYSFUNCTIONAL UTERINE BLEEDING): ICD-10-CM

## 2018-12-04 DIAGNOSIS — R03.0 ELEVATED BP WITHOUT DIAGNOSIS OF HYPERTENSION: ICD-10-CM

## 2018-12-04 DIAGNOSIS — J45.31 MILD PERSISTENT ASTHMA WITH ACUTE EXACERBATION: Primary | ICD-10-CM

## 2018-12-04 LAB
BASOPHILS # BLD AUTO: 0 10E9/L (ref 0–0.2)
BASOPHILS NFR BLD AUTO: 0.3 %
BETA HCG QUAL IFA URINE: NEGATIVE
DIFFERENTIAL METHOD BLD: ABNORMAL
EOSINOPHIL # BLD AUTO: 0 10E9/L (ref 0–0.7)
EOSINOPHIL NFR BLD AUTO: 0.3 %
ERYTHROCYTE [DISTWIDTH] IN BLOOD BY AUTOMATED COUNT: 14.4 % (ref 10–15)
FEF 25/75: NORMAL
FEV-1: NORMAL
FEV1/FVC: NORMAL
FVC: NORMAL
HCT VFR BLD AUTO: 41.9 % (ref 35–47)
HGB BLD-MCNC: 13.8 G/DL (ref 11.7–15.7)
LYMPHOCYTES # BLD AUTO: 1.6 10E9/L (ref 0.8–5.3)
LYMPHOCYTES NFR BLD AUTO: 15 %
MCH RBC QN AUTO: 26.9 PG (ref 26.5–33)
MCHC RBC AUTO-ENTMCNC: 32.9 G/DL (ref 31.5–36.5)
MCV RBC AUTO: 82 FL (ref 78–100)
MONOCYTES # BLD AUTO: 0.6 10E9/L (ref 0–1.3)
MONOCYTES NFR BLD AUTO: 5.1 %
NEUTROPHILS # BLD AUTO: 8.7 10E9/L (ref 1.6–8.3)
NEUTROPHILS NFR BLD AUTO: 79.3 %
NT-PROBNP SERPL-MCNC: 25 PG/ML (ref 0–125)
PLATELET # BLD AUTO: 335 10E9/L (ref 150–450)
RBC # BLD AUTO: 5.13 10E12/L (ref 3.8–5.2)
WBC # BLD AUTO: 10.9 10E9/L (ref 4–11)

## 2018-12-04 PROCEDURE — 94010 BREATHING CAPACITY TEST: CPT | Performed by: FAMILY MEDICINE

## 2018-12-04 PROCEDURE — 84703 CHORIONIC GONADOTROPIN ASSAY: CPT | Performed by: FAMILY MEDICINE

## 2018-12-04 PROCEDURE — 85025 COMPLETE CBC W/AUTO DIFF WBC: CPT | Performed by: FAMILY MEDICINE

## 2018-12-04 PROCEDURE — 99214 OFFICE O/P EST MOD 30 MIN: CPT | Mod: 25 | Performed by: FAMILY MEDICINE

## 2018-12-04 PROCEDURE — 36415 COLL VENOUS BLD VENIPUNCTURE: CPT | Performed by: FAMILY MEDICINE

## 2018-12-04 PROCEDURE — 80048 BASIC METABOLIC PNL TOTAL CA: CPT | Performed by: FAMILY MEDICINE

## 2018-12-04 PROCEDURE — 83880 ASSAY OF NATRIURETIC PEPTIDE: CPT | Performed by: FAMILY MEDICINE

## 2018-12-04 RX ORDER — ESCITALOPRAM OXALATE 10 MG/1
5 TABLET ORAL DAILY
Qty: 30 TABLET | Refills: 1 | Status: SHIPPED | OUTPATIENT
Start: 2018-12-04 | End: 2019-01-31

## 2018-12-04 ASSESSMENT — ANXIETY QUESTIONNAIRES
1. FEELING NERVOUS, ANXIOUS, OR ON EDGE: NEARLY EVERY DAY
2. NOT BEING ABLE TO STOP OR CONTROL WORRYING: SEVERAL DAYS
7. FEELING AFRAID AS IF SOMETHING AWFUL MIGHT HAPPEN: NEARLY EVERY DAY
GAD7 TOTAL SCORE: 9
6. BECOMING EASILY ANNOYED OR IRRITABLE: NOT AT ALL
5. BEING SO RESTLESS THAT IT IS HARD TO SIT STILL: NOT AT ALL
IF YOU CHECKED OFF ANY PROBLEMS ON THIS QUESTIONNAIRE, HOW DIFFICULT HAVE THESE PROBLEMS MADE IT FOR YOU TO DO YOUR WORK, TAKE CARE OF THINGS AT HOME, OR GET ALONG WITH OTHER PEOPLE: VERY DIFFICULT
3. WORRYING TOO MUCH ABOUT DIFFERENT THINGS: MORE THAN HALF THE DAYS

## 2018-12-04 ASSESSMENT — PATIENT HEALTH QUESTIONNAIRE - PHQ9: 5. POOR APPETITE OR OVEREATING: NOT AT ALL

## 2018-12-04 NOTE — TELEPHONE ENCOUNTER
JS,   Please advise in A.S' absence  Pt seen today - Advair Rx sent   Per below Advair not covered    Per C Laird as well, pt having hard time breathing and  will have to walk 2 miles to get Rx for her    Pharmacy needing alternative to Advair  Per fax, covered options are Dulera or Symbicort  Please advise  Thanks,  Kajal COKER RN

## 2018-12-04 NOTE — TELEPHONE ENCOUNTER
Reason for Call:  Other call back    Detailed comments: patient was given a Rx for Advair It is not covered Pharmacy faxed over req to Change patient would like a call to confirm when we send it over, she needs it carmela placed fax on [place]    Phone Number Patient can be reached at: Home number on file [phone] (home)    Best Time: soon    Can we leave a detailed message on this number? YES    Call taken on 12/4/2018 at 4:49 PM by Gina Laird

## 2018-12-04 NOTE — PATIENT INSTRUCTIONS
1  - Spirometry, Breathing Capacity: Normal Order, Clinic Performed- NORMAL, that's good   - fluticasone-salmeterol (ADVAIR) 500-50 MCG/DOSE inhaler; Inhale 1 puff into the lungs 2 times daily  Dispense: 1 Inhaler; Refill: 1 this is steroid inhaler and its for prevention of recurrent wheezing- use it twice daily after using albuterol.  Use albuterol only additionally than twice daily- if wheezing    - XR Chest 2 Views    2. DUB (dysfunctional uterine bleeding)  Plan: Menstrual calender to be maintained, marking first day of ever period and it should fall within normal limits of 21-35 days from the first day of normal menstrual blood flow      - Plan: negative urine pregnancy test   - CBC with platelets differential    3. Elevated BP without diagnosis of hypertension  Will recheck Blood pressure in follow up   - Basic metabolic panel    4. SOB (shortness of breath)    - BNP-N terminal pro- a heart enzyme     5. Anxiety  - consider counseling  - escitalopram (LEXAPRO) 10 MG tablet; Take 0.5 tablets (5 mg) by mouth daily  Dispense: 30 tablet; Refill: 1  -follow up  In 2-4wks

## 2018-12-04 NOTE — MR AVS SNAPSHOT
After Visit Summary   12/4/2018    Maribel Osuna    MRN: [id number]           Patient Information     Date Of Birth          1980        Visit Information        Provider Department      12/4/2018 2:20 PM Ida Murguia MD [facility]        Today's Diagnoses     Anxiety    -  1    DUB (dysfunctional uterine bleeding)        Elevated BP without diagnosis of hypertension          Care Instructions    1  - Spirometry, Breathing Capacity: Normal Order, Clinic Performed- NORMAL, that's good   - fluticasone-salmeterol (ADVAIR) 500-50 MCG/DOSE inhaler; Inhale 1 puff into the lungs 2 times daily  Dispense: 1 Inhaler; Refill: 1 this is steroid inhaler and its for prevention of recurrent wheezing- use it twice daily after using albuterol.  Use albuterol only additionally than twice daily- if wheezing    - XR Chest 2 Views    2. DUB (dysfunctional uterine bleeding)  Plan: Menstrual calender to be maintained, marking first day of ever period and it should fall within normal limits of 21-35 days from the first day of normal menstrual blood flow      - Plan: negative urine pregnancy test   - CBC with platelets differential    3. Elevated BP without diagnosis of hypertension  Will recheck Blood pressure in follow up   - Basic metabolic panel    4. SOB (shortness of breath)    - BNP-N terminal pro- a heart enzyme     5. Anxiety  - consider counseling  - escitalopram (LEXAPRO) 10 MG tablet; Take 0.5 tablets (5 mg) by mouth daily  Dispense: 30 tablet; Refill: 1  -follow up  In 2-4wks          Follow-ups after your visit        Who to contact     If you have questions or need follow up information about today's clinic visit or your schedule please contact [facility] directly at [phone].  Normal or non-critical lab and imaging results will be communicated to you by MyChart, letter or phone within 4 business days after the clinic has received the results. If you do not hear from us  "within 7 days, please contact the clinic through [employer] or phone. If you have a critical or abnormal lab result, we will notify you by phone as soon as possible.  Submit refill requests through [employer] or call your pharmacy and they will forward the refill request to us. Please allow 3 business days for your refill to be completed.          Additional Information About Your Visit        [employer] Information     [employer] gives you secure access to your electronic health record. If you see a primary care provider, you can also send messages to your care team and make appointments. If you have questions, please call your primary care clinic.  If you do not have a primary care provider, please call [phone] and they will assist you.        Care EveryWhere ID     This is your Care EveryWhere ID. This could be used by other organizations to access your [place] medical records  [id number]        Your Vitals Were     Pulse Temperature Height Last Period Pulse Oximetry BMI (Body Mass Index)    132 98.2  F (36.8  C) (Oral) 5' 5.5\" (1.664 m) (LMP Unknown) 100% 43.94 kg/m2       Blood Pressure from Last 3 Encounters:   12/04/18 (!) 128/96   11/26/18 142/88   08/28/18 116/74    Weight from Last 3 Encounters:   12/04/18 268 lb 1.6 oz (121.6 kg)   11/26/18 265 lb 9 oz (120.5 kg)   08/28/18 269 lb (122 kg)              We Performed the Following     Basic metabolic panel     Beta HCG Qual, Urine - [facility] [place] ([id number])     BNP-N terminal pro     CBC with platelets differential     Spirometry, Breathing Capacity: Normal Order, Clinic Performed     XR Chest 2 Views          Today's Medication Changes          These changes are accurate as of 12/4/18 11:59 PM.  If you have any questions, ask your nurse or doctor.               Start taking these medicines.        Dose/Directions    escitalopram 10 MG tablet   Commonly known as:  LEXAPRO   Used for:  Anxiety   Started by:  Ida Murguia MD        Dose:  5 mg   Take " 0.5 tablets (5 mg) by mouth daily   Quantity:  30 tablet   Refills:  1       mometasone-formoterol 200-5 MCG/ACT inhaler   Commonly known as:  DULERA   Used for:  Mild persistent asthma with acute exacerbation   Started by:  Ida Murguia MD        Dose:  2 puff   Inhale 2 puffs into the lungs 2 times daily   Quantity:  13 g   Refills:  1            Where to get your medicines      These medications were sent to [email or web address] [address] [facility] [address] AT [facility] [address] [facility] [address]     Phone:  [phone]     escitalopram 10 MG tablet    mometasone-formoterol 200-5 MCG/ACT inhaler                Primary Care Provider Office Phone # Fax #    Ida Murguia [phone] [address] [facility] [address]        Equal Access to Services     MARGARITO [facility] AH: Hadii kyra ku hadasho Sosherali, waaxda luqadaha, qaybta kaalmada adeegyada, waxay deanin hayyariel salazar . So [facility] [phone].    ATENCIÓN: Si habla español, tiene a quinonez disposición servicios gratuitos de asistencia lingüística. Llame al [phone].    We comply with applicable federal civil rights laws and [place] laws. We do not discriminate on the basis of race, color, national origin, age, disability, sex, sexual orientation, or gender identity.            Thank you!     Thank you for choosing [facility]  for your care. Our goal is always to provide you with excellent care. Hearing back from our patients is one way we can continue to improve our services. Please take a few minutes to complete the written survey that you may receive in the mail after your visit with us. Thank you!             Your Updated Medication List - Protect others around you: Learn how to safely use, store and throw away your medicines at www.disposemymeds.org.          This list is accurate as of 12/4/18 11:59 PM.  Always use your most recent med list.                    Brand Name Dispense Instructions for use Diagnosis    albuterol 108 (90 Base) MCG/ACT inhaler    PROAIR HFA/PROVENTIL HFA/VENTOLIN HFA    1 Inhaler    Inhale 2 puffs into the lungs every 6 hours as needed for shortness of breath / dyspnea or wheezing    Acute bronchospasm       amoxicillin 875 MG tablet    AMOXIL    20 tablet    Take 1 tablet (875 mg) by mouth 2 times daily    Sinobronchitis       ARTIFICIAL TEARS 83-15 % Oint           B-12 1000 MCG Tbcr     100 tablet    Take 100 mg by mouth daily        BENADRYL PO      Take 25 mg by mouth daily as needed        ciclopirox 8 % external solution    PENLAC    6.6 mL    Apply daily to involved nails x 1 year.  Once weekly, removed old medication and roughen nail with file.    Onychomycosis       escitalopram 10 MG tablet    LEXAPRO    30 tablet    Take 0.5 tablets (5 mg) by mouth daily    Anxiety       FISH OIL + D3 9355-5479 MG-UNIT Caps       Chronic rhinitis       fluticasone 50 MCG/ACT nasal spray    FLONASE    16 mL    SHAKE LIQUID AND USE 1-2 SPRAYS IN EACH NOSTRIL DAILY AS NEEDED FOR RHINITIS OR ALLERGIES.    Chronic rhinitis       guaiFENesin 600 MG 12 hr tablet    MUCINEX     Take 1,200 mg by mouth 2 times daily        medroxyPROGESTERone 150 MG/ML IM injection    DEPO-PROVERA    1 mL    Inject 1 mL (150 mg) into the muscle every 3 months    Irregular menstrual cycle       MELATONIN PO      Take 2.5 mg by mouth At Bedtime        mometasone-formoterol 200-5 MCG/ACT inhaler    DULERA    13 g    Inhale 2 puffs into the lungs 2 times daily    Mild persistent asthma with acute exacerbation       montelukast 10 MG tablet    SINGULAIR    30 tablet    Take 1 tablet (10 mg) by mouth At Bedtime        Multi-vitamin tablet     100 tablet    Take 1 tablet by mouth 3 times daily        SYSTANE OVERNIGHT THERAPY 0.3 % Gel ophthalmic gel   Generic drug:  hypromellose           VITAMIN C PO      Take 500 mg by mouth daily

## 2018-12-04 NOTE — NURSING NOTE
"Chief Complaint   Patient presents with     Asthma     URI     BP (!) 128/96  Pulse 132  Temp 98.2  F (36.8  C) (Oral)  Ht 5' 5.5\" (1.664 m)  Wt 268 lb 1.6 oz (121.6 kg)  LMP  (LMP Unknown)  SpO2 100%  BMI 43.94 kg/m2 Estimated body mass index is 43.94 kg/(m^2) as calculated from the following:    Height as of this encounter: 5' 5.5\" (1.664 m).    Weight as of this encounter: 268 lb 1.6 oz (121.6 kg).  Medication Reconciliation: complete      Health Maintenance that is potentially due pending provider review:  ACT    Completing ACT today.    JOVANY Harris  "
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
## 2018-12-05 LAB
ANION GAP SERPL CALCULATED.3IONS-SCNC: 9 MMOL/L (ref 3–14)
BUN SERPL-MCNC: 10 MG/DL (ref 7–30)
CALCIUM SERPL-MCNC: 9.5 MG/DL (ref 8.5–10.1)
CHLORIDE SERPL-SCNC: 107 MMOL/L (ref 94–109)
CO2 SERPL-SCNC: 21 MMOL/L (ref 20–32)
CREAT SERPL-MCNC: 0.69 MG/DL (ref 0.52–1.04)
GFR SERPL CREATININE-BSD FRML MDRD: >90 ML/MIN/1.7M2
GLUCOSE SERPL-MCNC: 104 MG/DL (ref 70–99)
POTASSIUM SERPL-SCNC: 3.8 MMOL/L (ref 3.4–5.3)
SODIUM SERPL-SCNC: 137 MMOL/L (ref 133–144)

## 2018-12-05 ASSESSMENT — ASTHMA QUESTIONNAIRES: ACT_TOTALSCORE: 5

## 2018-12-05 ASSESSMENT — ANXIETY QUESTIONNAIRES: GAD7 TOTAL SCORE: 9

## 2018-12-06 NOTE — PROGRESS NOTES
Negative urine pregnancy test  Negative BNP- that's good. No congestive heart failure.  Glucose was only slightly high- but its ok as you were not fasting  -Kidney function is normal (Cr, GFR), Sodium is normal, Potassium is normal, Calcium is normal.

## 2018-12-10 ENCOUNTER — MYC MEDICAL ADVICE (OUTPATIENT)
Dept: FAMILY MEDICINE | Facility: CLINIC | Age: 38
End: 2018-12-10

## 2018-12-10 DIAGNOSIS — F41.9 ANXIETY: Primary | ICD-10-CM

## 2018-12-10 NOTE — TELEPHONE ENCOUNTER
A.S  Please see [employer] message below.  Last OV 12/4/18 - patient started medication.  Thanks, Lu Rene RN

## 2018-12-13 RX ORDER — ESCITALOPRAM OXALATE 10 MG/1
10 TABLET ORAL DAILY
Qty: 30 TABLET | Refills: 1 | Status: SHIPPED | OUTPATIENT
Start: 2018-12-13 | End: 2018-12-13

## 2018-12-13 RX ORDER — ESCITALOPRAM OXALATE 10 MG/1
10 TABLET ORAL DAILY
Qty: 30 TABLET | Refills: 1 | Status: SHIPPED | OUTPATIENT
Start: 2018-12-13 | End: 2019-01-28

## 2018-12-13 NOTE — TELEPHONE ENCOUNTER
Can you please add the right request pharmacy to her pharm list  I think I sent it to [facility]

## 2019-01-26 DIAGNOSIS — J31.0 CHRONIC RHINITIS: ICD-10-CM

## 2019-01-28 DIAGNOSIS — J98.01 ACUTE BRONCHOSPASM: ICD-10-CM

## 2019-01-28 DIAGNOSIS — J45.31 MILD PERSISTENT ASTHMA WITH ACUTE EXACERBATION: ICD-10-CM

## 2019-01-28 DIAGNOSIS — F41.9 ANXIETY: ICD-10-CM

## 2019-01-28 RX ORDER — FLUTICASONE PROPIONATE 50 MCG
SPRAY, SUSPENSION (ML) NASAL
Qty: 16 ML | Refills: 0 | Status: SHIPPED | OUTPATIENT
Start: 2019-01-28 | End: 2019-02-25

## 2019-01-28 RX ORDER — ESCITALOPRAM OXALATE 10 MG/1
10 TABLET ORAL DAILY
Qty: 30 TABLET | Refills: 0 | Status: SHIPPED | OUTPATIENT
Start: 2019-01-28 | End: 2019-02-25

## 2019-01-28 NOTE — TELEPHONE ENCOUNTER
"Prescription approved per [facility] Refill Protocol.  Lu Rene RN    Requested Prescriptions   Signed Prescriptions Disp Refills     fluticasone (FLONASE) 50 MCG/ACT nasal spray 16 mL 0     Sig: SHAKE LIQUID AND USE 1 TO 2 SPRAYS IN EACH NOSTRIL DAILY AS NEEDED FOR RHINITIS OR ALLERGIES    Inhaled Steroids Protocol Failed - 1/26/2019  9:59 AM       Failed - Asthma control assessment score within normal limits in last 6 months    Please review ACT score.          Passed - Patient is age 12 or older       Passed - Medication is active on med list       Passed - Recent (6 mo) or future (30 days) visit within the authorizing provider's specialty    Patient had office visit in the last 6 months or has a visit in the next 30 days with authorizing provider or within the authorizing provider's specialty.  See \"Patient Info\" tab in inbasket, or \"Choose Columns\" in Meds & Orders section of the refill encounter.              "

## 2019-01-28 NOTE — TELEPHONE ENCOUNTER
Prescription approved per [facility] Refill Protocol.  1 month Rx's sent  Kajal COKER RN

## 2019-01-28 NOTE — TELEPHONE ENCOUNTER
Reason for Call:  Medication or medication refill:    Do you use a [facility]?  Name of the pharmacy and phone number for the current request:       [facility] [address] [place] [address] [address] AT  [address] [place] & [facility]      Name of the medication requested: mometasone-formoterol (DULERA) 200-5 MCG/ACT inhaler    escitalopram (LEXAPRO) 10 MG tablet    Other request: Pt has apt to see Dr. Murguia on 2/25- she is unable to come in before then due to having a sprained ankle- she is requesting a 1 month supply    Can we leave a detailed message on this number? YES    Phone number patient can be reached at: Home number on file [phone] (home)    Best Time: any    Call taken on 1/28/2019 at 10:51 AM by Pepper Merrill

## 2019-01-28 NOTE — TELEPHONE ENCOUNTER
"A.S.  Routing refill request to provider for review/approval because:  Rx given to patient for acute bronchospasm.  Last RX 11/26/18 for 1 inhaler with 1 refill  Last OV 12/4/18 - asthma follow up.  Future OV with you 2/25/19 - asthma follow up.    Thanks,  Lu Rene RN    Requested Prescriptions   Pending Prescriptions Disp Refills     albuterol (PROAIR HFA/PROVENTIL HFA/VENTOLIN HFA) 108 (90 Base) MCG/ACT inhaler [Pharmacy Med Name: ALBUTEROL HFA INH (200 PUFFS) 18GM] 18 g 0     Sig: INHALE TWO PUFFS BY MOUTH INTO THE LUNGS EVERY 6 HOURS AS NEEDED FOR SHORTNESS OF BREATH OR WHEEZING.    Asthma Maintenance Inhalers - Anticholinergics Failed - 1/28/2019 10:57 AM       Failed - Asthma control assessment score within normal limits in last 6 months    Please review ACT score.          Passed - Patient is age 12 years or older       Passed - Medication is active on med list       Passed - Recent (6 mo) or future (30 days) visit within the authorizing provider's specialty    Patient had office visit in the last 6 months or has a visit in the next 30 days with authorizing provider or within the authorizing provider's specialty.  See \"Patient Info\" tab in inbasket, or \"Choose Columns\" in Meds & Orders section of the refill encounter.                        "

## 2019-01-29 RX ORDER — ALBUTEROL SULFATE 90 UG/1
AEROSOL, METERED RESPIRATORY (INHALATION)
Qty: 18 G | Refills: 0 | Status: SHIPPED | OUTPATIENT
Start: 2019-01-29 | End: 2019-02-26

## 2019-01-31 ENCOUNTER — OFFICE VISIT (OUTPATIENT)
Dept: FAMILY MEDICINE | Facility: CLINIC | Age: 39
End: 2019-01-31
Payer: COMMERCIAL

## 2019-01-31 ENCOUNTER — ANCILLARY PROCEDURE (OUTPATIENT)
Dept: GENERAL RADIOLOGY | Facility: CLINIC | Age: 39
End: 2019-01-31
Payer: COMMERCIAL

## 2019-01-31 VITALS
OXYGEN SATURATION: 99 % | SYSTOLIC BLOOD PRESSURE: 120 MMHG | BODY MASS INDEX: 44.64 KG/M2 | DIASTOLIC BLOOD PRESSURE: 87 MMHG | HEART RATE: 93 BPM | TEMPERATURE: 98.9 F | WEIGHT: 272.38 LBS

## 2019-01-31 DIAGNOSIS — M25.572 PAIN IN JOINT INVOLVING ANKLE AND FOOT, LEFT: Primary | ICD-10-CM

## 2019-01-31 PROCEDURE — 99213 OFFICE O/P EST LOW 20 MIN: CPT | Performed by: PHYSICIAN ASSISTANT

## 2019-01-31 PROCEDURE — 73610 X-RAY EXAM OF ANKLE: CPT | Mod: LT

## 2019-01-31 ASSESSMENT — PAIN SCALES - GENERAL: PAINLEVEL: MODERATE PAIN (4)

## 2019-01-31 NOTE — RESULT ENCOUNTER NOTE
Dear Maribel    Your test results are attached, feel free to contact me via [email or web address]     The radiologist agrees with us and that no fracture is seen!    Omar Thomson PA-C

## 2019-01-31 NOTE — PROGRESS NOTES
SUBJECTIVE:   Marible Osuna is a 38 year old female who presents to clinic today for the following health issues:      Musculoskeletal problem/pain      Duration: Saturday 2 weeks    Description  Location: Left  Foot or ankle    Intensity:  moderate    Accompanying signs and symptoms: radiation of pain to somewhat up the calve muscles     History  Previous similar problem: No  Previous evaluation:  none    Precipitating or alleviating factors:  Trauma or overuse: YES-  Fall at coffee shop  Aggravating factors include: sitting, standing, walking, climbing stairs and overuse    Therapies tried and outcome: ice, immobilization and support wrap          Problem list and histories reviewed & adjusted, as indicated.  Additional history: left ankle injury about 2 weeks ago.  Inversion injury.  Elevating, compression, ice.  Still has pain while walking.    BP Readings from Last 3 Encounters:   01/31/19 120/87   12/04/18 (!) 128/96   11/26/18 142/88    Wt Readings from Last 3 Encounters:   01/31/19 123.5 kg (272 lb 6 oz)   12/04/18 121.6 kg (268 lb 1.6 oz)   11/26/18 120.5 kg (265 lb 9 oz)                    Reviewed and updated as needed this visit by clinical staff  Allergies  Meds       Reviewed and updated as needed this visit by Provider         ROS:  Constitutional, HEENT, cardiovascular, pulmonary, gi and gu systems are negative, except as otherwise noted.    OBJECTIVE:     /87   Pulse 93   Temp 98.9  F (37.2  C) (Oral)   Wt 123.5 kg (272 lb 6 oz)   LMP 01/17/2019   SpO2 99%   Breastfeeding? No   BMI 44.64 kg/m    Body mass index is 44.64 kg/m .  GENERAL: alert and no distress  EYES: Eyes grossly normal to inspection  RESP: lungs clear to auscultation - no rales, rhonchi or wheezes  CV: regular rate and rhythm, normal S1 S2, no S3 or S4, no murmur, click or rub, no peripheral edema and peripheral pulses strong  MS: mild swelling over left ankle, tender ATF ligament.  Non tender metatarsal  bones.    Diagnostic Test Results:  Results for orders placed or performed in visit on 01/31/19 (from the past 24 hour(s))   XR Ankle Left G/E 3 Views    Narrative    XR ANKLE LT G/E 3 VW 1/31/2019 3:11 PM    HISTORY: Left ankle pain.    COMPARISON: None.    FINDINGS: The mortise joint is congruent. No fracture or malalignment.  No significant osseous degenerative change.      Impression    IMPRESSION: Osseous structures appear normal.    ДМИТРИЙ BOSTON MD       ASSESSMENT/PLAN:             1. Pain in joint involving ankle and foot, left  No sign of fracture.  Discussed some at home PHYSICAL THERAPY exercises, and if symptoms persist or worsen to follow up with PHYSICAL THERAPY.  Crutches given.  - XR Ankle Left G/E 3 Views  - [facility]ERR[facility]; Future  - order for DME; Equipment being ordered: crutches  Dispense: 1 Device; Refill: 0        Wisam Thomson PA-C  [facility]

## 2019-01-31 NOTE — NURSING NOTE
"Chief Complaint   Patient presents with     Musculoskeletal Problem     /87   Pulse 93   Temp 98.9  F (37.2  C) (Oral)   Wt 123.5 kg (272 lb 6 oz)   LMP 01/17/2019   SpO2 99%   Breastfeeding? No   BMI 44.64 kg/m   Estimated body mass index is 44.64 kg/m  as calculated from the following:    Height as of 12/4/18: 1.664 m (5' 5.5\").    Weight as of this encounter: 123.5 kg (272 lb 6 oz).  bp completed using cuff size: large      Health Maintenance addressed:  NONE    n/a              "

## 2019-02-25 ENCOUNTER — OFFICE VISIT (OUTPATIENT)
Dept: FAMILY MEDICINE | Facility: CLINIC | Age: 39
End: 2019-02-25
Payer: COMMERCIAL

## 2019-02-25 ENCOUNTER — THERAPY VISIT (OUTPATIENT)
Dept: PHYSICAL THERAPY | Facility: CLINIC | Age: 39
End: 2019-02-25
Attending: PHYSICIAN ASSISTANT
Payer: COMMERCIAL

## 2019-02-25 VITALS
BODY MASS INDEX: 44.13 KG/M2 | TEMPERATURE: 98.5 F | HEART RATE: 82 BPM | OXYGEN SATURATION: 99 % | WEIGHT: 274.6 LBS | HEIGHT: 66 IN | DIASTOLIC BLOOD PRESSURE: 84 MMHG | SYSTOLIC BLOOD PRESSURE: 115 MMHG

## 2019-02-25 DIAGNOSIS — E66.01 MORBID OBESITY (H): ICD-10-CM

## 2019-02-25 DIAGNOSIS — M25.572 PAIN IN JOINT INVOLVING ANKLE AND FOOT, LEFT: ICD-10-CM

## 2019-02-25 DIAGNOSIS — F41.9 ANXIETY: Primary | ICD-10-CM

## 2019-02-25 DIAGNOSIS — J31.0 CHRONIC RHINITIS: ICD-10-CM

## 2019-02-25 DIAGNOSIS — J45.30 MILD PERSISTENT ASTHMA WITHOUT COMPLICATION: ICD-10-CM

## 2019-02-25 PROCEDURE — G8978 MOBILITY CURRENT STATUS: HCPCS | Mod: GP | Performed by: PHYSICAL THERAPIST

## 2019-02-25 PROCEDURE — 99214 OFFICE O/P EST MOD 30 MIN: CPT | Performed by: FAMILY MEDICINE

## 2019-02-25 PROCEDURE — G8979 MOBILITY GOAL STATUS: HCPCS | Mod: GP | Performed by: PHYSICAL THERAPIST

## 2019-02-25 PROCEDURE — 97110 THERAPEUTIC EXERCISES: CPT | Mod: GP | Performed by: PHYSICAL THERAPIST

## 2019-02-25 PROCEDURE — 97161 PT EVAL LOW COMPLEX 20 MIN: CPT | Mod: GP | Performed by: PHYSICAL THERAPIST

## 2019-02-25 RX ORDER — MONTELUKAST SODIUM 10 MG/1
10 TABLET ORAL AT BEDTIME
Qty: 30 TABLET | Refills: 11 | Status: SHIPPED | OUTPATIENT
Start: 2019-02-25 | End: 2019-07-24

## 2019-02-25 RX ORDER — FLUTICASONE PROPIONATE 50 MCG
SPRAY, SUSPENSION (ML) NASAL
Qty: 16 ML | Refills: 3 | Status: SHIPPED | OUTPATIENT
Start: 2019-02-25 | End: 2019-07-17

## 2019-02-25 RX ORDER — ESCITALOPRAM OXALATE 10 MG/1
20 TABLET ORAL DAILY
Qty: 180 TABLET | Refills: 3 | Status: SHIPPED | OUTPATIENT
Start: 2019-02-25 | End: 2019-02-25

## 2019-02-25 ASSESSMENT — MIFFLIN-ST. JEOR: SCORE: 1929.39

## 2019-02-25 ASSESSMENT — ANXIETY QUESTIONNAIRES
2. NOT BEING ABLE TO STOP OR CONTROL WORRYING: MORE THAN HALF THE DAYS
1. FEELING NERVOUS, ANXIOUS, OR ON EDGE: MORE THAN HALF THE DAYS
IF YOU CHECKED OFF ANY PROBLEMS ON THIS QUESTIONNAIRE, HOW DIFFICULT HAVE THESE PROBLEMS MADE IT FOR YOU TO DO YOUR WORK, TAKE CARE OF THINGS AT HOME, OR GET ALONG WITH OTHER PEOPLE: VERY DIFFICULT
5. BEING SO RESTLESS THAT IT IS HARD TO SIT STILL: NOT AT ALL
3. WORRYING TOO MUCH ABOUT DIFFERENT THINGS: MORE THAN HALF THE DAYS
6. BECOMING EASILY ANNOYED OR IRRITABLE: NOT AT ALL
GAD7 TOTAL SCORE: 8
7. FEELING AFRAID AS IF SOMETHING AWFUL MIGHT HAPPEN: MORE THAN HALF THE DAYS

## 2019-02-25 ASSESSMENT — PATIENT HEALTH QUESTIONNAIRE - PHQ9: 5. POOR APPETITE OR OVEREATING: NOT AT ALL

## 2019-02-25 NOTE — NURSING NOTE
"Chief Complaint   Patient presents with     Asthma     /84   Pulse 82   Temp 98.5  F (36.9  C) (Oral)   Ht 1.664 m (5' 5.5\")   Wt 124.6 kg (274 lb 9.6 oz)   LMP 01/24/2019 (Approximate)   SpO2 99%   BMI 45.00 kg/m   Estimated body mass index is 45 kg/m  as calculated from the following:    Height as of this encounter: 1.664 m (5' 5.5\").    Weight as of this encounter: 124.6 kg (274 lb 9.6 oz).  Medication Reconciliation: complete      Health Maintenance that is potentially due pending provider review:  NONE    n/a    JOVANY Harris  "

## 2019-02-25 NOTE — LETTER
My Asthma Action Plan  Name: Maribel Osuna   YOB: 1980  Date: 2/25/2019   My doctor: Ida Murguia MD   My clinic: [facility]        My Control Medicine: Mometasone + formoterol (Dulera) -  100/5 mcg bid  My Rescue Medicine: Albuterol (Proair/Ventolin/Proventil) inhaler as needed   My Asthma Severity: mild persistent  Avoid your asthma triggers: insects/rodents, mold, emotions and cold air  dust mites  animal dander  mold  emotions            GREEN ZONE   Good Control    I feel good    No cough or wheeze    Can work, sleep and play without asthma symptoms       Take your asthma control medicine every day.     1. If exercise triggers your asthma, take your rescue medication    15 minutes before exercise or sports, and    During exercise if you have asthma symptoms  2. Spacer to use with inhaler: If you have a spacer, make sure to use it with your inhaler             YELLOW ZONE Getting Worse  I have ANY of these:    I do not feel good    Cough or wheeze    Chest feels tight    Wake up at night   1. Keep taking your Green Zone medications  2. Start taking your rescue medicine:    every 20 minutes for up to 1 hour. Then every 4 hours for 24-48 hours.  3. If you stay in the Yellow Zone for more than 12-24 hours, contact your doctor.  4. If you do not return to the Green Zone in 12-24 hours or you get worse, start taking your oral steroid medicine if prescribed by your provider.           RED ZONE Medical Alert - Get Help  I have ANY of these:    I feel awful    Medicine is not helping    Breathing getting harder    Trouble walking or talking    Nose opens wide to breathe       1. Take your rescue medicine NOW  2. If your provider has prescribed an oral steroid medicine, start taking it NOW  3. Call your doctor NOW  4. If you are still in the Red Zone after 20 minutes and you have not reached your doctor:    Take your rescue medicine again and    Call 911 or go to the emergency room  right away    See your regular doctor within 2 weeks of an Emergency Room or Urgent Care visit for follow-up treatment.          Annual Reminders:  Meet with Asthma Educator,  Flu Shot in the Fall, consider Pneumonia Vaccination for patients with asthma (aged 19 and older).    Pharmacy:    [facility] [address] [place] [place], [place] - [address] [facility] & [id number] - [place], [place] - [address]                      Asthma Triggers  How To Control Things That Make Your Asthma Worse    Triggers are things that make your asthma worse.  Look at the list below to help you find your triggers and what you can do about them.  You can help prevent asthma flare-ups by staying away from your triggers.      Trigger                                                          What you can do   Cigarette Smoke  Tobacco smoke can make asthma worse. Do not allow smoking in your home, car or around you.  Be sure no one smokes at a child s day care or school.  If you smoke, ask your health care provider for ways to help you quit.  Ask family members to quit too.  Ask your health care provider for a referral to Quit Plan to help you quit smoking, or call 4-718-011-PLAN.     Colds, Flu, Bronchitis  These are common triggers of asthma. Wash your hands often.  Don t touch your eyes, nose or mouth.  Get a flu shot every year.     Dust Mites  These are tiny bugs that live in cloth or carpet. They are too small to see. Wash sheets and blankets in hot water every week.   Encase pillows and mattress in dust mite proof covers.  Avoid having carpet if you can. If you have carpet, vacuum weekly.   Use a dust mask and HEPA vacuum.   Pollen and Outdoor Mold  Some people are allergic to trees, grass, or weed pollen, or molds. Try to keep your windows closed.  Limit time out doors when pollen count is high.   Ask you health care provider about taking medicine during allergy season.     Animal Dander  Some  people are allergic to skin flakes, urine or saliva from pets with fur or feathers. Keep pets with fur or feathers out of your home.    If you can t keep the pet outdoors, then keep the pet out of your bedroom.  Keep the bedroom door closed.  Keep pets off cloth furniture and away from stuffed toys.     Mice, Rats, and Cockroaches  Some people are allergic to the waste from these pests.   Cover food and garbage.  Clean up spills and food crumbs.  Store grease in the refrigerator.   Keep food out of the bedroom.   Indoor Mold  This can be a trigger if your home has high moisture. Fix leaking faucets, pipes, or other sources of water.   Clean moldy surfaces.  Dehumidify basement if it is damp and smelly.   Smoke, Strong Odors, and Sprays  These can reduce air quality. Stay away from strong odors and sprays, such as perfume, powder, hair spray, paints, smoke incense, paint, cleaning products, candles and new carpet.   Exercise or Sports  Some people with asthma have this trigger. Be active!  Ask your doctor about taking medicine before sports or exercise to prevent symptoms.    Warm up for 5-10 minutes before and after sports or exercise.     Other Triggers of Asthma  Cold air:  Cover your nose and mouth with a scarf.  Sometimes laughing or crying can be a trigger.  Some medicines and food can trigger asthma.

## 2019-02-25 NOTE — PROGRESS NOTES
Physical Therapy Initial Evaluation    Precautions/Restrictions/MD instructions: PT eval and treat.       Subjective History:    Injury/Condition Details:  Presenting Complaint Maribel presents with L) ankle sprain. Occurred ~6 weeks ago, rolled her ankle in [employer] due to some unlevel anil. Was icing and compressing for first three weeks, then went to MD and got crutches. Has been on crutches until ~2 days ago. Wasn't able to bear any weight on it up until a couple of days ago. Has been doing some ankle pumps and circles, gets some soreness but able to do more and more. Typically bikes everywhere, wonders when she can get back to this.   Onset Timing/Date MD referral 1/31/19   Mechanism Rolled ankle while walking      Symptom Behavior Details   Primary Pain Symptoms Location: L) lateral ankle and up into lateral lower leg  Quality: aching, throbbing   Frequency: intermittent    Worst Pain 4/10   Best Pain 0/10   Symptom Provocators Any weight bearing, sitting, walking initially   Symptom Relievers Lying down, elevating   Time of day dependent? Worse as day goes on   Symptom change since onset? improved      Prior Testing/Intervention for current condition:  Prior Tests none   Prior Treatment Crutches, rest      Lifestyle & General Medical History  General Health Reported by Patient good   Employment/Activities Musician - self employed, mostly computer work/booking; bikes everywhere for exercise   Pertinent medical/surgical history Asthma, anemia, concussions, depression, fibromyalgia, migraines, overweight, sleep disorder   Patient Goals Reduce pain, return to biking and usual activities     ANKLE:     PROM L AROM L AROM R MMT L   Dorsiflexion  8 deg 15 deg 5/5   Plantarflexion  40 deg 50 deg 2/5   Inversion  20 deg 30 deg 4/5   Eversion  15 deg 25 deg 4/5     Edema:    General: mild edema lateral ankle    Palpation: mild tenderness L) lateral ankle, peroneal tendons    Gait: mild limp with slightly  reduced toe off, heel strike      Patient is a 38 year old female with left side ankle complaints.    Patient has the following significant findings with corresponding treatment plan.                Diagnosis 1:  L) ankle sprain  Pain -  manual therapy, self management, education and home program  Decreased ROM/flexibility - manual therapy and therapeutic exercise  Decreased strength - therapeutic exercise and therapeutic activities  Impaired balance - neuro re-education and therapeutic activities  Decreased proprioception - neuro re-education and therapeutic activities  Impaired muscle performance - neuro re-education  Decreased function - therapeutic activities    Therapy Evaluation Codes:   1) History comprised of:   Personal factors that impact the plan of care:      None.    Comorbidity factors that impact the plan of care are:      Asthma, Depression, Fibromyalgia, Migraines/headaches, Numbness/tingling and Overweight.     Medications impacting care: Anti-depressant.  2) Examination of Body Systems comprised of:   Body structures and functions that impact the plan of care:      Ankle.   Activity limitations that impact the plan of care are:      Running, Squatting/kneeling, Stairs, Walking and biking.  3) Clinical presentation characteristics are:   Stable/Uncomplicated.  4) Decision-Making    Low complexity using standardized patient assessment instrument and/or measureable assessment of functional outcome.  Cumulative Therapy Evaluation is: Low complexity.    Previous and current functional limitations:  (See Goal Flow Sheet for this information)    Short term and Long term goals: (See Goal Flow Sheet for this information)     Communication ability:  Patient appears to be able to clearly communicate and understand verbal and written communication and follow directions correctly.  Treatment Explanation - The following has been discussed with the patient:   RX ordered/plan of care  Anticipated  outcomes  Possible risks and side effects  This patient would benefit from PT intervention to resume normal activities.   Rehab potential is good.    Frequency:  1 X week, once daily  Duration:  for 8 weeks  Discharge Plan:  Achieve all LTG.  Independent in home treatment program.  Reach maximal therapeutic benefit.    Please refer to the daily flowsheet for treatment today, total treatment time and time spent performing 1:1 timed codes.

## 2019-02-25 NOTE — PROGRESS NOTES
SUBJECTIVE:   Maribel Osuna is a 38 year old female who presents to clinic today for the following health issues:      Asthma Follow-Up    Was ACT completed today?    Yes    ACT Total Scores 2/25/2019   ACT TOTAL SCORE (Goal Greater than or Equal to 20) 21   In the past 12 months, how many times did you visit the emergency room for your asthma without being admitted to the hospital? 0   In the past 12 months, how many times were you hospitalized overnight because of your asthma? 0       Recent asthma triggers that patient is dealing with: dust mites, animal dander, mold and emotions        Amount of exercise or physical activity: None    Problems taking medications regularly: No    Medication side effects: shakiness in hands     Diet: vegetarian/vegan        PROBLEMS TO ADD ON...feels that asthma attack from  Anxiety  Worried about a stalker- indirectly stalking her through her friends- as soon as heard that- felt asthma attack.  Increased lexapro 20 mg and next day felt better  Would like to eventually be off the asthma inhaler- due to risk of glucoma- dad has it- and lost his eye sights.  lizzie has the caution about use with gluoma  She has not been diagnosed with fglucoma- dneis any visual problems    Wt Readings from Last 5 Encounters:   02/25/19 124.6 kg (274 lb 9.6 oz)   01/31/19 123.5 kg (272 lb 6 oz)   12/04/18 121.6 kg (268 lb 1.6 oz)   11/26/18 120.5 kg (265 lb 9 oz)   08/28/18 122 kg (269 lb)       Problem list and histories reviewed & adjusted, as indicated.  Additional history: as documented    Patient Active Problem List   Diagnosis     Lipoma of skin and subcutaneous tissue     Fertility testing     Common wart     Anxiety     PTSD (post-traumatic stress disorder)     Abnormal EKG     Chest discomfort     Morbid obesity (H)     Mild intermittent asthma with acute exacerbation     Pain in joint involving ankle and foot, left     Past Surgical History:   Procedure Laterality Date     EXCISE LESION  TRUNK N/A 9/12/2014    Procedure: EXCISE LESION TRUNK;  Surgeon: Denver Cooper MD;  Location: [facility]       Social History     Tobacco Use     Smoking status: Never Smoker     Smokeless tobacco: Never Used   Substance Use Topics     Alcohol use: Yes     Alcohol/week: 0.0 oz     Comment: 1 per month     Family History   Problem Relation Age of Onset     Hypertension Mother      Allergies Mother      Arthritis Mother         rheumatoid     Depression Mother         Fa, mgf, mgm, pgm, pgf sis, bro all family     Lipids Mother      Psychotic Disorder Mother         all family     Hypertension Father      Neurologic Disorder Father      Glaucoma Father      Vision Loss Father      C.A.D. Maternal Grandmother      Heart Disease Maternal Grandmother      Alcohol/Drug Maternal Grandmother      Gynecology Maternal Grandmother      Lipids Maternal Grandmother      Obesity Maternal Grandmother      Cancer - colorectal Paternal Grandmother      Alcohol/Drug Paternal Grandmother      Alcohol/Drug Maternal Grandfather      Alcohol/Drug Paternal Grandfather      Allergies Sister      Allergies Brother      Diabetes No family hx of      Coronary Artery Disease No family hx of      Hyperlipidemia No family hx of      Cerebrovascular Disease No family hx of      Breast Cancer No family hx of      Colon Cancer No family hx of      Prostate Cancer No family hx of      Other Cancer No family hx of      Anxiety Disorder No family hx of      Mental Illness No family hx of      Substance Abuse No family hx of      Anesthesia Reaction No family hx of      Asthma No family hx of      Osteoporosis No family hx of      Genetic Disorder No family hx of      Thyroid Disease No family hx of      Unknown/Adopted No family hx of      Macular Degeneration No family hx of            Reviewed and updated as needed this visit by clinical staff  Tobacco  Allergies  Meds       Reviewed and updated as needed this visit by Provider      "    ROS:  Constitutional, HEENT, cardiovascular, pulmonary, GI, , musculoskeletal, neuro, skin, endocrine and psych systems are negative, except as otherwise noted.    OBJECTIVE:     /84   Pulse 82   Temp 98.5  F (36.9  C) (Oral)   Ht 1.664 m (5' 5.5\")   Wt 124.6 kg (274 lb 9.6 oz)   LMP 01/24/2019 (Approximate)   SpO2 99%   BMI 45.00 kg/m    Body mass index is 45 kg/m .  GENERAL: healthy, alert and no distress  HENT: ear canals and TM's normal, nose and mouth without ulcers or lesions  NECK: no adenopathy, no asymmetry, masses, or scars and thyroid normal to palpation  RESP: lungs clear to auscultation - no rales, rhonchi or wheezes  CV: regular rate and rhythm, normal S1 S2, no S3 or S4, no murmur, click or rub, no peripheral edema and peripheral pulses strong  ABDOMEN: soft, nontender, no hepatosplenomegaly, no masses and bowel sounds normal  Psych: Alert and oriented times 3; coherent speech, normal   rate and volume, able to articulate logical thoughts, able   to abstract reason, no tangential thoughts, no hallucinations   or delusions   ADRIANA-7 SCORE 4/12/2017 12/4/2018 2/25/2019   Total Score 16 9 8         ASSESSMENT/PLAN:   1. Anxiety  Plan: 39 yo female- recent stressors from  \"previous stalker\"-she heard from her friends that he was out looking for her and that escalated anxiety..    Increase the dose of lexapro to 20 mg once daily & follow up on phone or return office visit in 4 weeks, no follow up needed if doing better.  - escitalopram (LEXAPRO) 10 MG tablet; Take 2 tablets (20 mg) by mouth daily  Dispense: 180 tablet; Refill: 3  - CBT discussed, she reports counseling makes her anxiety worse.    2. Mild persistent asthma without complication  Plan: well controlled  Decrease the dose of dulera from  200 mcg  to 100mcg  If more wheezing- report back- and then resume hgher dose of dulera   - mometasone-formoterol (DULERA) 100-5 MCG/ACT inhaler; Inhale 2 puffs into the lungs 2 times daily  " Dispense: 1 Inhaler; Refill: 11  - montelukast (SINGULAIR) 10 MG tablet; Take 1 tablet (10 mg) by mouth At Bedtime  Dispense: 30 tablet; Refill: 11    3. Chronic rhinitis  Plan:- fluticasone (FLONASE) 50 MCG/ACT nasal spray; SHAKE LIQUID AND USE 1 TO 2 SPRAYS IN EACH NOSTRIL DAILY AS NEEDED FOR RHINITIS OR ALLERGIES  Dispense: 16 mL; Refill: 3    4. Morbid obesity (H)  Plan: life style changes- patient is motivated- lost some weight on own- gained back with recent ankle sprain        Ida Murguia MD  [facility]

## 2019-02-25 NOTE — PROGRESS NOTES
[facility] Initial Evaluation  Subjective:                                     General health as reported by patient is good.  Pertinent medical history includes:  Anemia, asthma, concussions/dizziness, depression, fibromyalgia, migraines/headaches, numbness/tingling, overweight, sleep disorder/apnea and other (cold/hot extremity, pain at night/res).    Other surgeries include:  Other (lipoma on back removed).  Current medications:  Anti-depressants, sleep medication and other (2 asthma inhalers,one allergy med).    Employment status: self employed.  Primary job tasks include:  Prolonged sitting, repetitive tasks and other (computer work).                                Objective:  System    Physical Exam    General     ROS    Assessment/Plan:

## 2019-02-26 ASSESSMENT — ANXIETY QUESTIONNAIRES: GAD7 TOTAL SCORE: 8

## 2019-02-26 ASSESSMENT — ASTHMA QUESTIONNAIRES: ACT_TOTALSCORE: 21

## 2019-03-01 ENCOUNTER — TELEPHONE (OUTPATIENT)
Dept: FAMILY MEDICINE | Facility: CLINIC | Age: 39
End: 2019-03-01

## 2019-03-01 NOTE — PATIENT INSTRUCTIONS
Increase lexapro  Follow up on phone or return office visit in 4 weeks    Continue with advair twice daily   Refills given

## 2019-03-01 NOTE — TELEPHONE ENCOUNTER
Left VM for patient at her home. Patient does not need a referral to see an allergist. Gave patient 2 clinics in [place] she could schedule an appt.     Faustina Nuno  Referral Coordinator

## 2019-03-01 NOTE — TELEPHONE ENCOUNTER
Reason for Call:  Other Referral     Detailed comments: The patient wants a referral to see either an Allergist or Asthma Specialist in [place]     Phone Number Patient can be reached at: Home number on file [phone] (home)    Best Time: Please place information on MY chart for the patient and she will get it from there     Can we leave a detailed message on this number? YES    Call taken on 3/1/2019 at 10:30 AM by Monica Wilks

## 2019-03-25 PROBLEM — M25.572 PAIN IN JOINT INVOLVING ANKLE AND FOOT, LEFT: Status: RESOLVED | Noted: 2019-02-25 | Resolved: 2019-02-25

## 2019-07-17 DIAGNOSIS — J31.0 CHRONIC RHINITIS: ICD-10-CM

## 2019-07-17 RX ORDER — FLUTICASONE PROPIONATE 50 MCG
SPRAY, SUSPENSION (ML) NASAL
Qty: 48 ML | Refills: 1 | Status: SHIPPED | OUTPATIENT
Start: 2019-07-17 | End: 2019-07-24

## 2019-07-17 NOTE — TELEPHONE ENCOUNTER
"Prescription approved per [facility] Refill Protocol.  Kajal COKER RN    Requested Prescriptions   Pending Prescriptions Disp Refills     fluticasone (FLONASE) 50 MCG/ACT nasal spray [Pharmacy Med Name: FLUTICASONE 50MCG NASAL SP (120) RX] 16 mL 0     Sig: SHAKE AND USE ONE OR TWO SPRAYS IN EACH NOSTRIL DAILY AS NEEDED FOR RHINITIS OR ALLERGIES       Inhaled Steroids Protocol Passed - 7/17/2019 12:36 PM        Passed - Patient is age 12 or older        Passed - Asthma control assessment score within normal limits in last 6 months     Please review ACT score.           Passed - Medication is active on med list        Passed - Recent (6 mo) or future (30 days) visit within the authorizing provider's specialty     Patient had office visit in the last 6 months or has a visit in the next 30 days with authorizing provider or within the authorizing provider's specialty.  See \"Patient Info\" tab in inbasket, or \"Choose Columns\" in Meds & Orders section of the refill encounter.              "

## 2019-07-24 ENCOUNTER — OFFICE VISIT (OUTPATIENT)
Dept: FAMILY MEDICINE | Facility: CLINIC | Age: 39
End: 2019-07-24
Payer: COMMERCIAL

## 2019-07-24 VITALS
DIASTOLIC BLOOD PRESSURE: 82 MMHG | SYSTOLIC BLOOD PRESSURE: 115 MMHG | HEIGHT: 66 IN | TEMPERATURE: 99 F | HEART RATE: 71 BPM | WEIGHT: 293 LBS | BODY MASS INDEX: 47.09 KG/M2 | OXYGEN SATURATION: 100 %

## 2019-07-24 DIAGNOSIS — F41.9 ANXIETY: Primary | ICD-10-CM

## 2019-07-24 DIAGNOSIS — J31.0 CHRONIC RHINITIS: ICD-10-CM

## 2019-07-24 DIAGNOSIS — J45.30 MILD PERSISTENT ASTHMA WITHOUT COMPLICATION: ICD-10-CM

## 2019-07-24 DIAGNOSIS — E66.01 MORBID OBESITY (H): ICD-10-CM

## 2019-07-24 PROCEDURE — 99214 OFFICE O/P EST MOD 30 MIN: CPT | Performed by: FAMILY MEDICINE

## 2019-07-24 RX ORDER — FLUTICASONE PROPIONATE 50 MCG
SPRAY, SUSPENSION (ML) NASAL
Qty: 48 ML | Refills: 3 | Status: SHIPPED | OUTPATIENT
Start: 2019-07-24 | End: 2020-08-04

## 2019-07-24 RX ORDER — ESCITALOPRAM OXALATE 20 MG/1
20 TABLET ORAL DAILY
Qty: 90 TABLET | Refills: 3 | Status: SHIPPED | OUTPATIENT
Start: 2019-07-24 | End: 2020-08-04

## 2019-07-24 RX ORDER — ALBUTEROL SULFATE 90 UG/1
1-2 AEROSOL, METERED RESPIRATORY (INHALATION) EVERY 6 HOURS PRN
Qty: 18 G | Refills: 11 | COMMUNITY
Start: 2019-07-24 | End: 2020-08-04

## 2019-07-24 RX ORDER — MONTELUKAST SODIUM 10 MG/1
10 TABLET ORAL AT BEDTIME
Qty: 90 TABLET | Refills: 3 | Status: SHIPPED | OUTPATIENT
Start: 2019-07-24 | End: 2020-08-04

## 2019-07-24 ASSESSMENT — ANXIETY QUESTIONNAIRES
IF YOU CHECKED OFF ANY PROBLEMS ON THIS QUESTIONNAIRE, HOW DIFFICULT HAVE THESE PROBLEMS MADE IT FOR YOU TO DO YOUR WORK, TAKE CARE OF THINGS AT HOME, OR GET ALONG WITH OTHER PEOPLE: NOT DIFFICULT AT ALL
2. NOT BEING ABLE TO STOP OR CONTROL WORRYING: SEVERAL DAYS
3. WORRYING TOO MUCH ABOUT DIFFERENT THINGS: SEVERAL DAYS
1. FEELING NERVOUS, ANXIOUS, OR ON EDGE: SEVERAL DAYS
6. BECOMING EASILY ANNOYED OR IRRITABLE: SEVERAL DAYS
7. FEELING AFRAID AS IF SOMETHING AWFUL MIGHT HAPPEN: MORE THAN HALF THE DAYS
5. BEING SO RESTLESS THAT IT IS HARD TO SIT STILL: NOT AT ALL
GAD7 TOTAL SCORE: 6

## 2019-07-24 ASSESSMENT — MIFFLIN-ST. JEOR: SCORE: 2054.58

## 2019-07-24 ASSESSMENT — PATIENT HEALTH QUESTIONNAIRE - PHQ9
SUM OF ALL RESPONSES TO PHQ QUESTIONS 1-9: 4
5. POOR APPETITE OR OVEREATING: NOT AT ALL

## 2019-07-24 NOTE — NURSING NOTE
"Chief Complaint   Patient presents with     Anxiety     Medication Request     allergy meds      /82   Pulse 71   Temp 99  F (37.2  C) (Oral)   Ht 1.664 m (5' 5.5\")   Wt 137.1 kg (302 lb 3.2 oz)   LMP 07/21/2019 (Approximate)   SpO2 100%   BMI 49.52 kg/m   Estimated body mass index is 49.52 kg/m  as calculated from the following:    Height as of this encounter: 1.664 m (5' 5.5\").    Weight as of this encounter: 137.1 kg (302 lb 3.2 oz).  Medication Reconciliation: complete      Health Maintenance that is potentially due pending provider review:  PHQ9    Possibly completing today per provider review.    JOVANY Harris  "

## 2019-07-24 NOTE — PROGRESS NOTES
Subjective     Maribel Osuna is a 38 year old female who presents to clinic today for the following health issues:    HPI   Anxiety Follow-Up    How are you doing with your anxiety since your last visit? Improved with increased dose of lexapro     Are you having other symptoms that might be associated with anxiety? Yes:  PTSD and mild asthma     Have you had a significant life event? No     Are you feeling depressed? No    Do you have any concerns with your use of alcohol or other drugs? No  ADRIANA-7 SCORE 12/4/2018 2/25/2019 7/24/2019   Total Score 9 8 6       Social History     Tobacco Use     Smoking status: Never Smoker     Smokeless tobacco: Never Used   Substance Use Topics     Alcohol use: Yes     Alcohol/week: 0.0 oz     Comment: 1 per month     Drug use: No     ADRIANA-7 SCORE 12/4/2018 2/25/2019 7/24/2019   Total Score 9 8 6     PHQ 4/12/2017 7/24/2019   PHQ-9 Total Score 19 4   Q9: Thoughts of better off dead/self-harm past 2 weeks Not at all Not at all     No flowsheet data found.      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  Needs longer prescription- has plans to get them in mail order- because often busy/helping film crew in [facility]- with limited or no access to pharmacy.  Recently was rationing on dose- and had significant flare up of anxiety- as the full  Dose of nitin apro 20 mg once daily was not avaliable .    She reports asthma is  Well controlled  Does not need steroid inhaler  Was tested positive for multiple environmental allergies grass tress, dust molds cats .  Need refill on singular & floase to keep asthma well controled from  Allergy as well      BP Readings from Last 3 Encounters:  "  07/24/19 115/82   02/25/19 115/84   01/31/19 120/87    Wt Readings from Last 3 Encounters:   07/24/19 137.1 kg (302 lb 3.2 oz)   02/25/19 124.6 kg (274 lb 9.6 oz)   01/31/19 123.5 kg (272 lb 6 oz)                    PROBLEMS TO ADD ON...  Reviewed and updated as needed this visit by Provider         Review of Systems   ROS COMP: Constitutional, HEENT, cardiovascular, pulmonary, GI, , musculoskeletal, neuro, skin, endocrine and psych systems are negative, except as otherwise noted.      Objective    /82   Pulse 71   Temp 99  F (37.2  C) (Oral)   Ht 1.664 m (5' 5.5\")   Wt 137.1 kg (302 lb 3.2 oz)   LMP 07/21/2019 (Approximate)   SpO2 100%   BMI 49.52 kg/m    Body mass index is 49.52 kg/m .  Physical Exam   GENERAL: healthy, alert and no distress  NECK: no adenopathy, no asymmetry, masses, or scars and thyroid normal to palpation  RESP: lungs clear to auscultation - no rales, rhonchi or wheezes  CV: regular rate and rhythm,   ABDOMEN: soft, nontender, no hepatosplenomegaly, no masses and bowel sounds normal  PSYCH: mentation appears normal, affect normal/bright    Diagnostic Test Results:  Labs reviewed in Epic        Assessment & Plan     1. Anxiety  Well controlled on current medications   - escitalopram (LEXAPRO) 20 MG tablet; Take 1 tablet (20 mg) by mouth daily  Dispense: 90 tablet; Refill: 3    2. Mild persistent asthma without complication  Well controlled  - albuterol (PROAIR HFA/PROVENTIL HFA/VENTOLIN HFA) 108 (90 Base) MCG/ACT inhaler; Inhale 1-2 puffs into the lungs every 6 hours as needed for shortness of breath / dyspnea or wheezing  Dispense: 18 g; Refill: 11  - montelukast (SINGULAIR) 10 MG tablet; Take 1 tablet (10 mg) by mouth At Bedtime  Dispense: 90 tablet; Refill: 3    3. Chronic rhinitis  - fluticasone (FLONASE) 50 MCG/ACT nasal spray; SHAKE AND USE ONE OR TWO SPRAYS IN EACH NOSTRIL DAILY AS NEEDED FOR RHINITIS OR ALLERGIES  Dispense: 48 mL; Refill: 3    4. Morbid obesity " "(H)  encouraged healthy eating habits & stay active        BMI:   Estimated body mass index is 49.52 kg/m  as calculated from the following:    Height as of this encounter: 1.664 m (5' 5.5\").    Weight as of this encounter: 137.1 kg (302 lb 3.2 oz).   Weight management plan: Discussed healthy diet and exercise guidelines        .    Return in about 6 months (around 1/24/2020) for anxiety.    Ida Murguia MD  [facility]      "

## 2019-07-25 ASSESSMENT — ANXIETY QUESTIONNAIRES: GAD7 TOTAL SCORE: 6

## 2019-07-25 ASSESSMENT — ASTHMA QUESTIONNAIRES: ACT_TOTALSCORE: 25

## 2020-08-04 ENCOUNTER — VIRTUAL VISIT (OUTPATIENT)
Dept: FAMILY MEDICINE | Facility: CLINIC | Age: 40
End: 2020-08-04
Payer: COMMERCIAL

## 2020-08-04 DIAGNOSIS — E66.01 MORBID OBESITY (H): ICD-10-CM

## 2020-08-04 DIAGNOSIS — F41.9 ANXIETY: ICD-10-CM

## 2020-08-04 DIAGNOSIS — J45.30 MILD PERSISTENT ASTHMA WITHOUT COMPLICATION: Primary | ICD-10-CM

## 2020-08-04 DIAGNOSIS — J31.0 CHRONIC RHINITIS: ICD-10-CM

## 2020-08-04 PROCEDURE — 99214 OFFICE O/P EST MOD 30 MIN: CPT | Mod: 95 | Performed by: FAMILY MEDICINE

## 2020-08-04 RX ORDER — ALBUTEROL SULFATE 90 UG/1
1-2 AEROSOL, METERED RESPIRATORY (INHALATION) EVERY 6 HOURS PRN
Qty: 18 G | Refills: 11 | Status: SHIPPED | OUTPATIENT
Start: 2020-08-04 | End: 2021-07-19

## 2020-08-04 RX ORDER — FLUTICASONE PROPIONATE 50 MCG
SPRAY, SUSPENSION (ML) NASAL
Qty: 48 ML | Refills: 3 | Status: SHIPPED | OUTPATIENT
Start: 2020-08-04 | End: 2021-07-19

## 2020-08-04 RX ORDER — MONTELUKAST SODIUM 10 MG/1
10 TABLET ORAL AT BEDTIME
Qty: 90 TABLET | Refills: 3 | Status: SHIPPED | OUTPATIENT
Start: 2020-08-04 | End: 2021-07-19

## 2020-08-04 RX ORDER — ESCITALOPRAM OXALATE 20 MG/1
10 TABLET ORAL DAILY
Qty: 45 TABLET | Refills: 3 | Status: SHIPPED | OUTPATIENT
Start: 2020-08-04 | End: 2021-07-19

## 2020-08-04 ASSESSMENT — ANXIETY QUESTIONNAIRES
2. NOT BEING ABLE TO STOP OR CONTROL WORRYING: NOT AT ALL
5. BEING SO RESTLESS THAT IT IS HARD TO SIT STILL: NOT AT ALL
6. BECOMING EASILY ANNOYED OR IRRITABLE: NOT AT ALL
3. WORRYING TOO MUCH ABOUT DIFFERENT THINGS: NOT AT ALL
GAD7 TOTAL SCORE: 0
7. FEELING AFRAID AS IF SOMETHING AWFUL MIGHT HAPPEN: NOT AT ALL
1. FEELING NERVOUS, ANXIOUS, OR ON EDGE: NOT AT ALL
IF YOU CHECKED OFF ANY PROBLEMS ON THIS QUESTIONNAIRE, HOW DIFFICULT HAVE THESE PROBLEMS MADE IT FOR YOU TO DO YOUR WORK, TAKE CARE OF THINGS AT HOME, OR GET ALONG WITH OTHER PEOPLE: NOT DIFFICULT AT ALL

## 2020-08-04 ASSESSMENT — PATIENT HEALTH QUESTIONNAIRE - PHQ9: 5. POOR APPETITE OR OVEREATING: NOT AT ALL

## 2020-08-04 NOTE — PROGRESS NOTES
"Maribel Osuna is a 39 year old female who is being evaluated via a billable telephone visit.      The patient has been notified of following:     \"This telephone visit will be conducted via a call between you and your physician/provider. We have found that certain health care needs can be provided without the need for a physical exam.  This service lets us provide the care you need with a short phone conversation.  If a prescription is necessary we can send it directly to your pharmacy.  If lab work is needed we can place an order for that and you can then stop by our lab to have the test done at a later time.    Telephone visits are billed at different rates depending on your insurance coverage. During this emergency period, for some insurers they may be billed the same as an in-person visit.  Please reach out to your insurance provider with any questions.    If during the course of the call the physician/provider feels a telephone visit is not appropriate, you will not be charged for this service.\"    Patient has given verbal consent for Telephone visit?  Yes    What phone number would you like to be contacted at? [phone]    How would you like to obtain your AVS? Arlent    Subjective     Maribel Osuna is a 39 year old female who presents via phone visit today for the following health issues:    HPI    Asthma Follow-Up- doing well  [place]  [employer] [place]  Enjoying gardening.  Feeling safer [place]- due to [place]  Asthma well controlled.  Needs refills  Never needs rescue inhaler.  Wondering to decrease lexapro as anxiety well controlled  & anxiety also used to trigger the asthma attacks as well.    Was ACT completed today?    Yes    ACT Total Scores 8/4/2020   ACT TOTAL SCORE (Goal Greater than or Equal to 20) 25   In the past 12 months, how many times did you visit the emergency room for your asthma without being admitted to the hospital? 0   In the past 12 " months, how many times were you hospitalized overnight because of your asthma? 0         How many days per week do you miss taking your asthma controller medication?  I do not have an asthma controller medication    Please describe any recent triggers for your asthma: pollens, animal dander, insects/rodents, humidity, emotions and cold air    Have you had any Emergency Room Visits, Urgent Care Visits, or Hospital Admissions since your last office visit?  No      How many servings of fruits and vegetables do you eat daily?  4 or more    On average, how many sweetened beverages do you drink each day (Examples: soda, juice, sweet tea, etc.  Do NOT count diet or artificially sweetened beverages)?   0    How many days per week do you exercise enough to make your heart beat faster? 7    How many minutes a day do you exercise enough to make your heart beat faster? 60 or more    How many days per week do you miss taking your medication? 0            BP Readings from Last 3 Encounters:   07/24/19 115/82   02/25/19 115/84   01/31/19 120/87    Wt Readings from Last 3 Encounters:   07/24/19 137.1 kg (302 lb 3.2 oz)   02/25/19 124.6 kg (274 lb 9.6 oz)   01/31/19 123.5 kg (272 lb 6 oz)                    Reviewed and updated as needed this visit by Provider         Review of Systems   Constitutional, HEENT, cardiovascular, pulmonary, GI, , musculoskeletal, neuro, skin, endocrine and psych systems are negative, except as otherwise noted.       Objective   Reported vitals:  There were no vitals taken for this visit.   healthy, alert and no distress  PSYCH: Alert and oriented times 3; coherent speech, normal   rate and volume, able to articulate logical thoughts, able   to abstract reason, no tangential thoughts, no hallucinations   or delusions  Her affect is normal  RESP: No cough, no audible wheezing, able to talk in full sentences  Remainder of exam unable to be completed due to telephone visits    Diagnostic Test  Results:  Labs reviewed in Epic        Assessment/Plan:    1. Mild persistent asthma without complication-controlled    - montelukast (SINGULAIR) 10 MG tablet; Take 1 tablet (10 mg) by mouth At Bedtime  Dispense: 90 tablet; Refill: 3  - albuterol (PROAIR HFA/PROVENTIL HFA/VENTOLIN HFA) 108 (90 Base) MCG/ACT inhaler; Inhale 1-2 puffs into the lungs every 6 hours as needed for shortness of breath / dyspnea or wheezing  Dispense: 18 g; Refill: 11    2. Chronic rhinitis  - fluticasone (FLONASE) 50 MCG/ACT nasal spray; SHAKE AND USE ONE OR TWO SPRAYS IN EACH NOSTRIL DAILY AS NEEDED FOR RHINITIS OR ALLERGIES  Dispense: 48 mL; Refill: 3    3. Morbid obesity (H)  Encouraged calories and portion control      4. Anxiety  ADRIANA-7 SCORE 2/25/2019 7/24/2019 8/4/2020   Total Score 8 6 0   we discussed to decrease the lexapro from  20 mg to 10 mg once daily  And she will keep us posted if would like to go back on a higher dose    - escitalopram (LEXAPRO) 20 MG tablet; Take 0.5 tablets (10 mg) by mouth daily  Dispense: 45 tablet; Refill: 3    Return in about 6 months (around 2/4/2021) for virtual/video visit.      Phone call duration:  12 minutes    Ida Murguia MD

## 2020-08-04 NOTE — LETTER
My Asthma Action Plan    Name: Maribel Osuna   YOB: 1980  Date: 8/4/2020   My doctor: Ida Murguia MD   My clinic: [facility]        My Control Medicine: Montelukast (Singulair) -  10 mg once a day  My Rescue Medicine: Albuterol (Proair/Ventolin/Proventil HFA) 2-4 puffs EVERY 4 HOURS as needed. Use a spacer if recommended by your provider.   My Asthma Severity:   Mild Persistent  Know your asthma triggers: enviromental allergies  dust mites  animal dander  mold  emotions            GREEN ZONE   Good Control    I feel good    No cough or wheeze    Can work, sleep and play without asthma symptoms       Take your asthma control medicine every day.     1. If exercise triggers your asthma, take your rescue medication    15 minutes before exercise or sports, and    During exercise if you have asthma symptoms  2. Spacer to use with inhaler: If you have a spacer, make sure to use it with your inhaler             YELLOW ZONE Getting Worse  I have ANY of these:    I do not feel good    Cough or wheeze    Chest feels tight    Wake up at night   1. Keep taking your Green Zone medications  2. Start taking your rescue medicine:    every 20 minutes for up to 1 hour. Then every 4 hours for 24-48 hours.  3. If you stay in the Yellow Zone for more than 12-24 hours, contact your doctor.  4. If you do not return to the Green Zone in 12-24 hours or you get worse, start taking your oral steroid medicine if prescribed by your provider.           RED ZONE Medical Alert - Get Help  I have ANY of these:    I feel awful    Medicine is not helping    Breathing getting harder    Trouble walking or talking    Nose opens wide to breathe       1. Take your rescue medicine NOW  2. If your provider has prescribed an oral steroid medicine, start taking it NOW  3. Call your doctor NOW  4. If you are still in the Red Zone after 20 minutes and you have not reached your doctor:    Take your rescue medicine again  and    Call 911 or go to the emergency room right away    See your regular doctor within 2 weeks of an Emergency Room or Urgent Care visit for follow-up treatment.          Annual Reminders:  Meet with Asthma Educator,  Flu Shot in the Fall, consider Pneumonia Vaccination for patients with asthma (aged 19 and older).    Pharmacy:    [facility] #[id number] - [place], [place] - [address] & [facility] #[id number] - [place], [place] - [address]  [facility] - [place], [place] - [address]    Electronically signed by Ida Murguia MD   Date: 08/04/20                      Asthma Triggers  How To Control Things That Make Your Asthma Worse    Triggers are things that make your asthma worse.  Look at the list below to help you find your triggers and what you can do about them.  You can help prevent asthma flare-ups by staying away from your triggers.      Trigger                                                          What you can do   Cigarette Smoke  Tobacco smoke can make asthma worse. Do not allow smoking in your home, car or around you.  Be sure no one smokes at a child s day care or school.  If you smoke, ask your health care provider for ways to help you quit.  Ask family members to quit too.  Ask your health care provider for a referral to Quit Plan to help you quit smoking, or call 0-172-456-PLAN.     Colds, Flu, Bronchitis  These are common triggers of asthma. Wash your hands often.  Don t touch your eyes, nose or mouth.  Get a flu shot every year.     Dust Mites  These are tiny bugs that live in cloth or carpet. They are too small to see. Wash sheets and blankets in hot water every week.   Encase pillows and mattress in dust mite proof covers.  Avoid having carpet if you can. If you have carpet, vacuum weekly.   Use a dust mask and HEPA vacuum.   Pollen and Outdoor Mold  Some people are allergic to trees, grass, or weed pollen, or  molds. Try to keep your windows closed.  Limit time out doors when pollen count is high.   Ask you health care provider about taking medicine during allergy season.     Animal Dander  Some people are allergic to skin flakes, urine or saliva from pets with fur or feathers. Keep pets with fur or feathers out of your home.    If you can t keep the pet outdoors, then keep the pet out of your bedroom.  Keep the bedroom door closed.  Keep pets off cloth furniture and away from stuffed toys.     Mice, Rats, and Cockroaches   Some people are allergic to the waste from these pests.   Cover food and garbage.  Clean up spills and food crumbs.  Store grease in the refrigerator.   Keep food out of the bedroom.   Indoor Mold  This can be a trigger if your home has high moisture. Fix leaking faucets, pipes, or other sources of water.   Clean moldy surfaces.  Dehumidify basement if it is damp and smelly.   Smoke, Strong Odors, and Sprays  These can reduce air quality. Stay away from strong odors and sprays, such as perfume, powder, hair spray, paints, smoke incense, paint, cleaning products, candles and new carpet.   Exercise or Sports  Some people with asthma have this trigger. Be active!  Ask your doctor about taking medicine before sports or exercise to prevent symptoms.    Warm up for 5-10 minutes before and after sports or exercise.     Other Triggers of Asthma  Cold air:  Cover your nose and mouth with a scarf.  Sometimes laughing or crying can be a trigger.  Some medicines and food can trigger asthma.

## 2020-08-05 ASSESSMENT — ANXIETY QUESTIONNAIRES: GAD7 TOTAL SCORE: 0

## 2020-08-05 ASSESSMENT — ASTHMA QUESTIONNAIRES: ACT_TOTALSCORE: 25

## 2021-07-16 NOTE — PROGRESS NOTES
Maribel is a 40 year old who is being evaluated via a billable video visit.      How would you like to obtain your AVS? MyChart  If the video visit is dropped, the invitation should be resent by: Text to cell phone: [phone]  Will anyone else be joining your video visit? No      Video Start Time: 12:58  Connected at 1;06 PM    Assessment & Plan     Mild persistent asthma without complication  Controlled.triggered by grass, mold, humidity, trees animal dander/cat and dust.  AAP/ACT -controlled  - montelukast (SINGULAIR) 10 MG tablet; Take 1 tablet (10 mg) by mouth At Bedtime    - albuterol (PROAIR HFA/PROVENTIL HFA/VENTOLIN HFA) 108 (90 Base) MCG/ACT inhaler; Inhale 1-2 puffs into the lungs every 6 hours as needed for shortness of breath / dyspnea or wheezing         Chronic rhinitis  - fluticasone (FLONASE) 50 MCG/ACT nasal spray; SHAKE AND USE ONE OR TWO SPRAYS IN EACH NOSTRIL DAILY AS NEEDED FOR RHINITIS OR ALLERGIES  - over the counter allegra     Anxiety  ADRIANA-7 SCORE 7/24/2019 8/4/2020 7/19/2021   Total Score 6 0 17   Stressed financially because unable to perform  Very worried about delta variant and unwilling to come for exam   Increase lexapro to full tab 20 mg once daily & follow up in 1-2 months   - escitalopram (LEXAPRO) 20 MG tablet; Take 1 tablet (20 mg) by mouth daily    Morbid obesity (H)  Encouraged to excercise and eat better    Screen for STD (sexually transmitted disease)    - HIV Antigen Antibody Combo; Future  - Hepatitis C Screen Reflex to HCV RNA Quant and Genotype; Future      Very worried about delta variant and unwilling to come for exam   Encouraged  to make appointment for complete physical as over  due for pap   30 minutes spent on the date of the encounter doing chart review, history and exam, documentation and further activities per the note  [id number]}         Return in about 4 weeks (around 8/16/2021) for routine physical.    Ida Murguia MD  [facility]  TRANG López is a 40 year old who presents for the following health issues  HPI     Anxiety Follow-Up    How are you doing with your anxiety since your last visit? Worsened     Are you having other symptoms that might be associated with anxiety? No    Have you had a significant life event? OTHER: COVID      Are you feeling depressed? No    Do you have any concerns with your use of alcohol or other drugs? No    Social History     Tobacco Use     Smoking status: Never Smoker     Smokeless tobacco: Never Used   Substance Use Topics     Alcohol use: Yes     Alcohol/week: 0.0 standard drinks     Comment: 1 per month     Drug use: No     ADRIANA-7 SCORE 7/24/2019 8/4/2020 7/19/2021   Total Score 6 0 17     PHQ 4/12/2017 7/24/2019   PHQ-9 Total Score 19 4   Q9: Thoughts of better off dead/self-harm past 2 weeks Not at all Not at all         Asthma Follow-Up    Was ACT completed today?    Yes    ACT Total Scores 7/19/2021   ACT TOTAL SCORE (Goal Greater than or Equal to 20) 25   In the past 12 months, how many times did you visit the emergency room for your asthma without being admitted to the hospital? 0   In the past 12 months, how many times were you hospitalized overnight because of your asthma? 0          How many days per week do you miss taking your asthma controller medication?  I do not have an asthma controller medication    Please describe any recent triggers for your asthma: None    Have you had any Emergency Room Visits, Urgent Care Visits, or Hospital Admissions since your last office visit?  No      How many servings of fruits and vegetables do you eat daily?  0-1    On average, how many sweetened beverages do you drink each day (Examples: soda, juice, sweet tea, etc.  Do NOT count diet or artificially sweetened beverages)?   1    How many days per week do you exercise enough to make your heart beat faster? 3 or less    How many minutes a day do you exercise enough to make your heart beat faster?  30 - 60    How many days per week do you miss taking your medication? 0      Up to date  On vaccination [facility] COVID   Very stressed financially    Review of Systems staying between friends- 2 set of friends. Performers.  Constitutional, HEENT, cardiovascular, pulmonary, GI, , musculoskeletal, neuro, skin, endocrine and psych systems are negative, except as otherwise noted.      Objective           Vitals:  No vitals were obtained today due to virtual visit.    Physical Exam   GENERAL: Healthy, alert and no distress  EYES: Eyes grossly normal to inspection.  No discharge or erythema, or obvious scleral/conjunctival abnormalities.  RESP: No audible wheeze, cough, or visible cyanosis.  No visible retractions or increased work of breathing.    SKIN: Visible skin clear. No significant rash, abnormal pigmentation or lesions.  NEURO: Cranial nerves grossly intact.  Mentation and speech appropriate for age.  PSYCH: Mentation appears normal, affect normal/bright, judgement and insight intact, normal speech and appearance well-groomed.                Video-Visit Details    Type of service:  Video Visit    Video End Time:1:26 PM    Originating Location (pt. Location): Home    Distant Location (provider location):  [facility]     Platform used for Video Visit: StephanieStarriser

## 2021-07-19 ENCOUNTER — VIRTUAL VISIT (OUTPATIENT)
Dept: FAMILY MEDICINE | Facility: CLINIC | Age: 41
End: 2021-07-19
Payer: COMMERCIAL

## 2021-07-19 DIAGNOSIS — Z11.3 SCREEN FOR STD (SEXUALLY TRANSMITTED DISEASE): ICD-10-CM

## 2021-07-19 DIAGNOSIS — J31.0 CHRONIC RHINITIS: ICD-10-CM

## 2021-07-19 DIAGNOSIS — F41.9 ANXIETY: ICD-10-CM

## 2021-07-19 DIAGNOSIS — E66.01 MORBID OBESITY (H): ICD-10-CM

## 2021-07-19 DIAGNOSIS — J45.30 MILD PERSISTENT ASTHMA WITHOUT COMPLICATION: Primary | ICD-10-CM

## 2021-07-19 PROCEDURE — 99214 OFFICE O/P EST MOD 30 MIN: CPT | Mod: 95 | Performed by: FAMILY MEDICINE

## 2021-07-19 RX ORDER — FLUTICASONE PROPIONATE 50 MCG
SPRAY, SUSPENSION (ML) NASAL
Qty: 48 ML | Refills: 3 | Status: SHIPPED | OUTPATIENT
Start: 2021-07-19

## 2021-07-19 RX ORDER — ALBUTEROL SULFATE 90 UG/1
1-2 AEROSOL, METERED RESPIRATORY (INHALATION) EVERY 6 HOURS PRN
Qty: 18 G | Refills: 11 | Status: SHIPPED | OUTPATIENT
Start: 2021-07-19

## 2021-07-19 RX ORDER — ESCITALOPRAM OXALATE 20 MG/1
20 TABLET ORAL DAILY
Qty: 30 TABLET | Refills: 2 | Status: SHIPPED | OUTPATIENT
Start: 2021-07-19

## 2021-07-19 RX ORDER — MONTELUKAST SODIUM 10 MG/1
10 TABLET ORAL AT BEDTIME
Qty: 90 TABLET | Refills: 0 | Status: SHIPPED | OUTPATIENT
Start: 2021-07-19

## 2021-07-19 ASSESSMENT — ANXIETY QUESTIONNAIRES
3. WORRYING TOO MUCH ABOUT DIFFERENT THINGS: MORE THAN HALF THE DAYS
1. FEELING NERVOUS, ANXIOUS, OR ON EDGE: NEARLY EVERY DAY
GAD7 TOTAL SCORE: 17
6. BECOMING EASILY ANNOYED OR IRRITABLE: NOT AT ALL
7. FEELING AFRAID AS IF SOMETHING AWFUL MIGHT HAPPEN: NEARLY EVERY DAY
IF YOU CHECKED OFF ANY PROBLEMS ON THIS QUESTIONNAIRE, HOW DIFFICULT HAVE THESE PROBLEMS MADE IT FOR YOU TO DO YOUR WORK, TAKE CARE OF THINGS AT HOME, OR GET ALONG WITH OTHER PEOPLE: VERY DIFFICULT
2. NOT BEING ABLE TO STOP OR CONTROL WORRYING: NEARLY EVERY DAY
5. BEING SO RESTLESS THAT IT IS HARD TO SIT STILL: NEARLY EVERY DAY

## 2021-07-19 ASSESSMENT — PATIENT HEALTH QUESTIONNAIRE - PHQ9: 5. POOR APPETITE OR OVEREATING: NEARLY EVERY DAY

## 2021-07-19 NOTE — LETTER
My Asthma Action Plan    Name: Maribel Osuna   YOB: 1980  Date: 7/19/2021   My doctor: Ida Murguia MD   My clinic: [facility]        My Rescue Medicine:   Albuterol inhaler (Proair/Ventolin/Proventil HFA)  2-4 puffs EVERY 4 HOURS as needed. Use a spacer if recommended by your provider.   My Asthma Severity:   Intermittent / Exercise Induced  Know your asthma triggers: dust mites, insects/rodents, mold, humidity and exercise or sports  pollens  animal dander  insects/rodents  humidity  emotions  cold air          GREEN ZONE   Good Control    I feel good    No cough or wheeze    Can work, sleep and play without asthma symptoms       Take your asthma control medicine every day.     1. If exercise triggers your asthma, take your rescue medication    15 minutes before exercise or sports, and    During exercise if you have asthma symptoms  2. Spacer to use with inhaler: If you have a spacer, make sure to use it with your inhaler             YELLOW ZONE Getting Worse  I have ANY of these:    I do not feel good    Cough or wheeze    Chest feels tight    Wake up at night   1. Keep taking your Green Zone medications  2. Start taking your rescue medicine:    every 20 minutes for up to 1 hour. Then every 4 hours for 24-48 hours.  3. If you stay in the Yellow Zone for more than 12-24 hours, contact your doctor.  4. If you do not return to the Green Zone in 12-24 hours or you get worse, start taking your oral steroid medicine if prescribed by your provider.           RED ZONE Medical Alert - Get Help  I have ANY of these:    I feel awful    Medicine is not helping    Breathing getting harder    Trouble walking or talking    Nose opens wide to breathe       1. Take your rescue medicine NOW  2. If your provider has prescribed an oral steroid medicine, start taking it NOW  3. Call your doctor NOW  4. If you are still in the Red Zone after 20 minutes and you have not reached your  doctor:    Take your rescue medicine again and    Call 911 or go to the emergency room right away    See your regular doctor within 2 weeks of an Emergency Room or Urgent Care visit for follow-up treatment.          Annual Reminders:  Meet with Asthma Educator,  Flu Shot in the Fall, consider Pneumonia Vaccination for patients with asthma (aged 19 and older).    Pharmacy:    [id number] - [place], [place] - [address] & [facility] #[id number] - [place], [place] - [address]    Electronically signed by Ida Murguia MD   Date: 07/19/21                    Asthma Triggers  How To Control Things That Make Your Asthma Worse    Triggers are things that make your asthma worse.  Look at the list below to help you find your triggers and   what you can do about them. You can help prevent asthma flare-ups by staying away from your triggers.      Trigger                                                          What you can do   Cigarette Smoke  Tobacco smoke can make asthma worse. Do not allow smoking in your home, car or around you.  Be sure no one smokes at a child s day care or school.  If you smoke, ask your health care provider for ways to help you quit.  Ask family members to quit too.  Ask your health care provider for a referral to Quit Plan to help you quit smoking, or call 4-492-971-PLAN.     Colds, Flu, Bronchitis  These are common triggers of asthma. Wash your hands often.  Don t touch your eyes, nose or mouth.  Get a flu shot every year.     Dust Mites  These are tiny bugs that live in cloth or carpet. They are too small to see. Wash sheets and blankets in hot water every week.   Encase pillows and mattress in dust mite proof covers.  Avoid having carpet if you can. If you have carpet, vacuum weekly.   Use a dust mask and HEPA vacuum.   Pollen and Outdoor Mold  Some people are allergic to trees, grass,  or weed pollen, or molds. Try to keep your windows closed.  Limit time out doors when pollen count is high.   Ask you health care provider about taking medicine during allergy season.     Animal Dander  Some people are allergic to skin flakes, urine or saliva from pets with fur or feathers. Keep pets with fur or feathers out of your home.    If you can t keep the pet outdoors, then keep the pet out of your bedroom.  Keep the bedroom door closed.  Keep pets off cloth furniture and away from stuffed toys.     Mice, Rats, and Cockroaches  Some people are allergic to the waste from these pests.   Cover food and garbage.  Clean up spills and food crumbs.  Store grease in the refrigerator.   Keep food out of the bedroom.   Indoor Mold  This can be a trigger if your home has high moisture. Fix leaking faucets, pipes, or other sources of water.   Clean moldy surfaces.  Dehumidify basement if it is damp and smelly.   Smoke, Strong Odors, and Sprays  These can reduce air quality. Stay away from strong odors and sprays, such as perfume, powder, hair spray, paints, smoke incense, paint, cleaning products, candles and new carpet.   Exercise or Sports  Some people with asthma have this trigger. Be active!  Ask your doctor about taking medicine before sports or exercise to prevent symptoms.    Warm up for 5-10 minutes before and after sports or exercise.     Other Triggers of Asthma  Cold air:  Cover your nose and mouth with a scarf.  Sometimes laughing or crying can be a trigger.  Some medicines and food can trigger asthma.

## 2021-07-20 ASSESSMENT — ANXIETY QUESTIONNAIRES: GAD7 TOTAL SCORE: 17

## 2021-07-20 ASSESSMENT — ASTHMA QUESTIONNAIRES: ACT_TOTALSCORE: 25

## 2022-10-21 NOTE — TELEPHONE ENCOUNTER
"Type of Remedy: Allergy    Safe Medications to Take During Pregnancy    Diphenhydramine (Benadryl®)  Loratidine (Claritin®)  Cetirizine (Zyrtec®)    Type of Remedy: Cold and Flu    Safe Medications to Take During Pregnancy    Diphenhydramine (Benadryl)*  Dextromethorphan (Robitussin®)*  Guaifenesin (Mucinex® [plain]) *  Vicks Vapor Rub® mentholated cream  Mentholated or non-mentholated cough drops  (Sugar-free cough drops for gestational diabetes should not contain blends of herbs or aspartame)  Pseudoephedrine ([Sudafed®] after 1st trimester)  Acetaminophen (Tylenol®)*  Saline nasal drops or spray  Warm salt/water gargle  *Note: Do not take the \"SA\" (Sustained Action) form of these drugs or the \"Multi-Symptom\" form of these drugs. Do not use Nyquil® due to its high alcohol content.    Type of Remedy: Diarrhea    Safe Medications to Take During Pregnancy    Loperamide ([Imodium®] after 1st trimester, for 24 hours only)    Type of Remedy: Constipation    Safe Medications to Take During Pregnancy    Methylcellulose fiber (Citrucel®)  Docusate (Colace®)  psyllium (Fiberall®, Metamucil®)  polycarbophil (FiberCon®)  polyethylene glycol (MiraLAX®)*  *Occasional use only    Type of Remedy: First Aid Ointment    Safe Medications to Take During Pregnancy    Bacitracin  Neomycin/polymyxin B/bacitracin (Neosporin®)    Type of Remedy: Headache    Safe Medications to Take During Pregnancy    Acetaminophen (Tylenol)    Type of Remedy: Heartburn    Safe Medications to Take During Pregnancy    Aluminum hydroxide/magnesium carbonate (Gaviscon®)*  Famotidine (Pepcid AC®)  Aluminum hydroxide/magnesium hydroxide (Maalox®)  Calcium carbonate/magnesium carbonate (Mylanta®)  Calcium carbonate (Titralac®, Tums®)  Ranitidine (Zantac®)  *Occasional use only    Type of Remedy: Hemorrhoids    Safe Medications to Take During Pregnancy    Phenylephrine/mineral oil/petrolatum (Preparation H®)  Witch hazel (Tucks® pads or ointment)    Type of " Prescription approved per [facility] Refill Protocol to pharmacy pt requested instead  Pt informed via Nga COKER RN     Remedy: Insect repellant    Safe Medications to Take During Pregnancy    N,N-diethyl-meta-toluamide (DEET®)    Type of Remedy: Nausea and Vomiting    Safe Medications to Take During Pregnancy    Diphenhydramine (Benadryl)  Vitamin B6    Type of Remedy: Rashes    Safe Medications to Take During Pregnancy    Diphenhydramine cream (Benadryl)  Hydrocortisone cream or ointment  Oatmeal bath (Aveeno®)    Type of Remedy: Sleep    Safe Medications to Take During Pregnancy    Diphenhydramine (Unisom SleepGels®, Benadryl)    Type of Remedy: Yeast Infection    Safe Medications to Take During Pregnancy    Miconazole (Monistat®)      *Please note: No drug can be considered 100% safe to use during pregnancy.